# Patient Record
Sex: FEMALE | Race: WHITE | Employment: FULL TIME
[De-identification: names, ages, dates, MRNs, and addresses within clinical notes are randomized per-mention and may not be internally consistent; named-entity substitution may affect disease eponyms.]

---

## 2017-02-20 ENCOUNTER — HOSPITAL ENCOUNTER (OUTPATIENT)
Age: 52
Discharge: HOME | End: 2017-02-20
Payer: COMMERCIAL

## 2017-02-20 ENCOUNTER — HOSPITAL ENCOUNTER (OUTPATIENT)
Dept: GENERAL RADIOLOGY | Age: 52
Discharge: HOME | End: 2017-02-20
Payer: COMMERCIAL

## 2017-02-20 DIAGNOSIS — R05.8 COUGH WITH SPUTUM: ICD-10-CM

## 2017-02-20 PROCEDURE — 71020 XR CHEST STANDARD TWO VW: CPT | Performed by: RADIOLOGY

## 2017-02-20 PROCEDURE — 71020 XR CHEST STANDARD TWO VW: CPT

## 2017-03-08 ENCOUNTER — HOSPITAL ENCOUNTER (OUTPATIENT)
Age: 52
Discharge: HOME | End: 2017-03-08
Payer: COMMERCIAL

## 2017-03-08 ENCOUNTER — HOSPITAL ENCOUNTER (OUTPATIENT)
Dept: GENERAL RADIOLOGY | Age: 52
Discharge: HOME | End: 2017-03-08
Payer: COMMERCIAL

## 2017-03-08 DIAGNOSIS — J14 PNEUMONIA OF LEFT LOWER LOBE DUE TO HAEMOPHILUS INFLUENZAE (HCC): ICD-10-CM

## 2017-03-08 PROCEDURE — 71020 XR CHEST STANDARD TWO VW: CPT

## 2017-03-25 ENCOUNTER — APPOINTMENT (OUTPATIENT)
Dept: GENERAL RADIOLOGY | Age: 52
DRG: 291 | End: 2017-03-25
Payer: COMMERCIAL

## 2017-03-25 ENCOUNTER — HOSPITAL ENCOUNTER (INPATIENT)
Age: 52
LOS: 6 days | Discharge: HOME | DRG: 291 | End: 2017-03-31
Attending: EMERGENCY MEDICINE
Payer: COMMERCIAL

## 2017-03-25 DIAGNOSIS — I48.92 PAROXYSMAL ATRIAL FLUTTER (HCC): ICD-10-CM

## 2017-03-25 DIAGNOSIS — I50.9 CONGESTIVE HEART FAILURE, UNSPECIFIED CONGESTIVE HEART FAILURE CHRONICITY, UNSPECIFIED CONGESTIVE HEART FAILURE TYPE: Primary | ICD-10-CM

## 2017-03-25 DIAGNOSIS — J18.9 PNEUMONIA DUE TO ORGANISM: ICD-10-CM

## 2017-03-25 DIAGNOSIS — N17.9 AKI (ACUTE KIDNEY INJURY) (HCC): ICD-10-CM

## 2017-03-25 LAB
ABSOLUTE EOS #: 0 K/UL (ref 0–0.4)
ABSOLUTE LYMPH #: 2.4 K/UL (ref 1–4.8)
ABSOLUTE MONO #: 0.4 K/UL (ref 0.1–1.3)
ANION GAP SERPL CALCULATED.3IONS-SCNC: 15 MMOL/L (ref 9–17)
BASOPHILS # BLD: 1 % (ref 0–2)
BASOPHILS ABSOLUTE: 0.1 K/UL (ref 0–0.2)
BNP INTERPRETATION: ABNORMAL
BUN BLDV-MCNC: 26 MG/DL (ref 6–20)
BUN/CREAT BLD: ABNORMAL (ref 9–20)
CALCIUM SERPL-MCNC: 9.3 MG/DL (ref 8.6–10.4)
CHLORIDE BLD-SCNC: 103 MMOL/L (ref 98–107)
CO2: 20 MMOL/L (ref 20–31)
CREAT SERPL-MCNC: 0.97 MG/DL (ref 0.5–0.9)
DIFFERENTIAL TYPE: ABNORMAL
EOSINOPHILS RELATIVE PERCENT: 1 % (ref 0–4)
GFR AFRICAN AMERICAN: >60 ML/MIN
GFR NON-AFRICAN AMERICAN: >60 ML/MIN
GFR SERPL CREATININE-BSD FRML MDRD: ABNORMAL ML/MIN/{1.73_M2}
GFR SERPL CREATININE-BSD FRML MDRD: ABNORMAL ML/MIN/{1.73_M2}
GLUCOSE BLD-MCNC: 158 MG/DL (ref 70–99)
HCT VFR BLD CALC: 45.1 % (ref 36–46)
HEMOGLOBIN: 14.9 G/DL (ref 12–16)
LYMPHOCYTES # BLD: 26 % (ref 24–44)
MAGNESIUM: 2 MG/DL (ref 1.6–2.6)
MCH RBC QN AUTO: 29.2 PG (ref 26–34)
MCHC RBC AUTO-ENTMCNC: 33.1 G/DL (ref 31–37)
MCV RBC AUTO: 88.3 FL (ref 80–100)
MONOCYTES # BLD: 4 % (ref 1–7)
PDW BLD-RTO: 13.4 % (ref 11.5–14.9)
PLATELET # BLD: 197 K/UL (ref 150–450)
PLATELET ESTIMATE: ABNORMAL
PMV BLD AUTO: 10.4 FL (ref 6–12)
POTASSIUM SERPL-SCNC: 4.7 MMOL/L (ref 3.7–5.3)
PRO-BNP: 4978 PG/ML
RBC # BLD: 5.11 M/UL (ref 4–5.2)
RBC # BLD: ABNORMAL 10*6/UL
SEG NEUTROPHILS: 68 % (ref 36–66)
SEGMENTED NEUTROPHILS ABSOLUTE COUNT: 6.4 K/UL (ref 1.3–9.1)
SODIUM BLD-SCNC: 138 MMOL/L (ref 135–144)
TROPONIN INTERP: NORMAL
TROPONIN T: <0.03 NG/ML
WBC # BLD: 9.3 K/UL (ref 3.5–11)
WBC # BLD: ABNORMAL 10*3/UL

## 2017-03-25 PROCEDURE — 93005 ELECTROCARDIOGRAM TRACING: CPT

## 2017-03-25 PROCEDURE — 36415 COLL VENOUS BLD VENIPUNCTURE: CPT

## 2017-03-25 PROCEDURE — 2060000000 HC ICU INTERMEDIATE R&B

## 2017-03-25 PROCEDURE — 71020 XR CHEST STANDARD TWO VW: CPT

## 2017-03-25 PROCEDURE — 6360000002 HC RX W HCPCS: Performed by: EMERGENCY MEDICINE

## 2017-03-25 PROCEDURE — 2580000003 HC RX 258

## 2017-03-25 PROCEDURE — 6370000000 HC RX 637 (ALT 250 FOR IP): Performed by: EMERGENCY MEDICINE

## 2017-03-25 PROCEDURE — 85025 COMPLETE CBC W/AUTO DIFF WBC: CPT

## 2017-03-25 PROCEDURE — 99285 EMERGENCY DEPT VISIT HI MDM: CPT

## 2017-03-25 PROCEDURE — 83880 ASSAY OF NATRIURETIC PEPTIDE: CPT

## 2017-03-25 PROCEDURE — 80048 BASIC METABOLIC PNL TOTAL CA: CPT

## 2017-03-25 PROCEDURE — 83735 ASSAY OF MAGNESIUM: CPT

## 2017-03-25 PROCEDURE — 2500000003 HC RX 250 WO HCPCS: Performed by: EMERGENCY MEDICINE

## 2017-03-25 PROCEDURE — 2580000003 HC RX 258: Performed by: EMERGENCY MEDICINE

## 2017-03-25 PROCEDURE — 84484 ASSAY OF TROPONIN QUANT: CPT

## 2017-03-25 RX ORDER — DOXYCYCLINE 100 MG/1
100 TABLET ORAL 2 TIMES DAILY
Qty: 13 TABLET | Refills: 0 | Status: SHIPPED | OUTPATIENT
Start: 2017-03-25 | End: 2017-03-31

## 2017-03-25 RX ORDER — ALBUTEROL SULFATE 90 UG/1
1-2 AEROSOL, METERED RESPIRATORY (INHALATION) EVERY 4 HOURS PRN
Qty: 1 INHALER | Refills: 0 | Status: SHIPPED | OUTPATIENT
Start: 2017-03-25 | End: 2017-03-31

## 2017-03-25 RX ORDER — ACETAMINOPHEN 325 MG/1
650 TABLET ORAL EVERY 4 HOURS PRN
Status: DISCONTINUED | OUTPATIENT
Start: 2017-03-25 | End: 2017-03-31 | Stop reason: HOSPADM

## 2017-03-25 RX ORDER — ONDANSETRON 4 MG/1
4 TABLET, ORALLY DISINTEGRATING ORAL EVERY 8 HOURS PRN
Qty: 8 TABLET | Refills: 0 | Status: SHIPPED | OUTPATIENT
Start: 2017-03-25 | End: 2017-03-31

## 2017-03-25 RX ORDER — DOXYCYCLINE 100 MG/1
100 CAPSULE ORAL ONCE
Status: COMPLETED | OUTPATIENT
Start: 2017-03-25 | End: 2017-03-25

## 2017-03-25 RX ORDER — DILTIAZEM HYDROCHLORIDE 5 MG/ML
20 INJECTION INTRAVENOUS ONCE
Status: COMPLETED | OUTPATIENT
Start: 2017-03-25 | End: 2017-03-25

## 2017-03-25 RX ORDER — SODIUM CHLORIDE 0.9 % (FLUSH) 0.9 %
10 SYRINGE (ML) INJECTION PRN
Status: DISCONTINUED | OUTPATIENT
Start: 2017-03-25 | End: 2017-03-31 | Stop reason: HOSPADM

## 2017-03-25 RX ORDER — ONDANSETRON 4 MG/1
4 TABLET, FILM COATED ORAL ONCE
Status: COMPLETED | OUTPATIENT
Start: 2017-03-25 | End: 2017-03-25

## 2017-03-25 RX ORDER — BUDESONIDE AND FORMOTEROL FUMARATE DIHYDRATE 160; 4.5 UG/1; UG/1
2 AEROSOL RESPIRATORY (INHALATION) 2 TIMES DAILY
Status: ON HOLD | COMMUNITY
End: 2017-04-26

## 2017-03-25 RX ORDER — SODIUM CHLORIDE 0.9 % (FLUSH) 0.9 %
10 SYRINGE (ML) INJECTION EVERY 12 HOURS SCHEDULED
Status: DISCONTINUED | OUTPATIENT
Start: 2017-03-25 | End: 2017-03-31 | Stop reason: HOSPADM

## 2017-03-25 RX ADMIN — DILTIAZEM HYDROCHLORIDE 5 MG/HR: 5 INJECTION INTRAVENOUS at 21:55

## 2017-03-25 RX ADMIN — ENOXAPARIN SODIUM 90 MG: 100 INJECTION SUBCUTANEOUS at 21:17

## 2017-03-25 RX ADMIN — DOXYCYCLINE 100 MG: 100 CAPSULE ORAL at 20:36

## 2017-03-25 RX ADMIN — ONDANSETRON HYDROCHLORIDE 4 MG: 4 TABLET, FILM COATED ORAL at 19:51

## 2017-03-25 RX ADMIN — Medication 10 ML: at 22:47

## 2017-03-25 RX ADMIN — DILTIAZEM HYDROCHLORIDE 20 MG: 5 INJECTION INTRAVENOUS at 21:48

## 2017-03-25 ASSESSMENT — ENCOUNTER SYMPTOMS
ABDOMINAL PAIN: 0
NAUSEA: 1
CONSTIPATION: 0
COLOR CHANGE: 0
COUGH: 1
SHORTNESS OF BREATH: 1
VOMITING: 1
DIARRHEA: 0

## 2017-03-25 ASSESSMENT — PAIN SCALES - GENERAL: PAINLEVEL_OUTOF10: 0

## 2017-03-26 ENCOUNTER — APPOINTMENT (OUTPATIENT)
Dept: CT IMAGING | Age: 52
DRG: 291 | End: 2017-03-26
Payer: COMMERCIAL

## 2017-03-26 PROBLEM — I48.92 ATRIAL FLUTTER (HCC): Status: ACTIVE | Noted: 2017-03-26

## 2017-03-26 LAB
ABSOLUTE EOS #: 0.1 K/UL (ref 0–0.4)
ABSOLUTE LYMPH #: 2.3 K/UL (ref 1–4.8)
ABSOLUTE MONO #: 0.6 K/UL (ref 0.1–1.3)
ANION GAP SERPL CALCULATED.3IONS-SCNC: 14 MMOL/L (ref 9–17)
BASOPHILS # BLD: 1 % (ref 0–2)
BASOPHILS ABSOLUTE: 0.1 K/UL (ref 0–0.2)
BUN BLDV-MCNC: 18 MG/DL (ref 6–20)
BUN/CREAT BLD: ABNORMAL (ref 9–20)
CALCIUM SERPL-MCNC: 9 MG/DL (ref 8.6–10.4)
CHLORIDE BLD-SCNC: 102 MMOL/L (ref 98–107)
CO2: 22 MMOL/L (ref 20–31)
CREAT SERPL-MCNC: 0.81 MG/DL (ref 0.5–0.9)
CULTURE: ABNORMAL
CULTURE: ABNORMAL
DIFFERENTIAL TYPE: NORMAL
DIRECT EXAM: ABNORMAL
DIRECT EXAM: NORMAL
EOSINOPHILS RELATIVE PERCENT: 1 % (ref 0–4)
GFR AFRICAN AMERICAN: >60 ML/MIN
GFR NON-AFRICAN AMERICAN: >60 ML/MIN
GFR SERPL CREATININE-BSD FRML MDRD: ABNORMAL ML/MIN/{1.73_M2}
GFR SERPL CREATININE-BSD FRML MDRD: ABNORMAL ML/MIN/{1.73_M2}
GLUCOSE BLD-MCNC: 163 MG/DL (ref 70–99)
HCT VFR BLD CALC: 43 % (ref 36–46)
HEMOGLOBIN: 13.8 G/DL (ref 12–16)
LYMPHOCYTES # BLD: 29 % (ref 24–44)
Lab: ABNORMAL
Lab: NORMAL
Lab: NORMAL
MCH RBC QN AUTO: 28.7 PG (ref 26–34)
MCHC RBC AUTO-ENTMCNC: 32.2 G/DL (ref 31–37)
MCV RBC AUTO: 89.1 FL (ref 80–100)
MONOCYTES # BLD: 7 % (ref 1–7)
PDW BLD-RTO: 13.4 % (ref 11.5–14.9)
PLATELET # BLD: 185 K/UL (ref 150–450)
PLATELET ESTIMATE: NORMAL
PMV BLD AUTO: 10.6 FL (ref 6–12)
POTASSIUM SERPL-SCNC: 4.3 MMOL/L (ref 3.7–5.3)
RBC # BLD: 4.82 M/UL (ref 4–5.2)
RBC # BLD: NORMAL 10*6/UL
SEG NEUTROPHILS: 62 % (ref 36–66)
SEGMENTED NEUTROPHILS ABSOLUTE COUNT: 4.9 K/UL (ref 1.3–9.1)
SODIUM BLD-SCNC: 138 MMOL/L (ref 135–144)
SPECIMEN DESCRIPTION: ABNORMAL
SPECIMEN DESCRIPTION: ABNORMAL
SPECIMEN DESCRIPTION: NORMAL
STATUS: ABNORMAL
STATUS: NORMAL
STATUS: NORMAL
WBC # BLD: 7.9 K/UL (ref 3.5–11)
WBC # BLD: NORMAL 10*3/UL

## 2017-03-26 PROCEDURE — 80048 BASIC METABOLIC PNL TOTAL CA: CPT

## 2017-03-26 PROCEDURE — 87899 AGENT NOS ASSAY W/OPTIC: CPT

## 2017-03-26 PROCEDURE — 6360000002 HC RX W HCPCS: Performed by: INTERNAL MEDICINE

## 2017-03-26 PROCEDURE — 2060000000 HC ICU INTERMEDIATE R&B

## 2017-03-26 PROCEDURE — 87070 CULTURE OTHR SPECIMN AEROBIC: CPT

## 2017-03-26 PROCEDURE — 87449 NOS EACH ORGANISM AG IA: CPT

## 2017-03-26 PROCEDURE — 2580000003 HC RX 258: Performed by: EMERGENCY MEDICINE

## 2017-03-26 PROCEDURE — 2580000003 HC RX 258

## 2017-03-26 PROCEDURE — 36415 COLL VENOUS BLD VENIPUNCTURE: CPT

## 2017-03-26 PROCEDURE — 2580000003 HC RX 258: Performed by: INTERNAL MEDICINE

## 2017-03-26 PROCEDURE — 87040 BLOOD CULTURE FOR BACTERIA: CPT

## 2017-03-26 PROCEDURE — 2500000003 HC RX 250 WO HCPCS: Performed by: EMERGENCY MEDICINE

## 2017-03-26 PROCEDURE — 6360000002 HC RX W HCPCS

## 2017-03-26 PROCEDURE — 85025 COMPLETE CBC W/AUTO DIFF WBC: CPT

## 2017-03-26 PROCEDURE — 6370000000 HC RX 637 (ALT 250 FOR IP): Performed by: INTERNAL MEDICINE

## 2017-03-26 PROCEDURE — 6370000000 HC RX 637 (ALT 250 FOR IP)

## 2017-03-26 PROCEDURE — 71260 CT THORAX DX C+: CPT

## 2017-03-26 PROCEDURE — 87205 SMEAR GRAM STAIN: CPT

## 2017-03-26 PROCEDURE — 6360000004 HC RX CONTRAST MEDICATION

## 2017-03-26 PROCEDURE — 86738 MYCOPLASMA ANTIBODY: CPT

## 2017-03-26 RX ORDER — 0.9 % SODIUM CHLORIDE 0.9 %
100 INTRAVENOUS SOLUTION INTRAVENOUS ONCE
Status: COMPLETED | OUTPATIENT
Start: 2017-03-26 | End: 2017-03-26

## 2017-03-26 RX ORDER — CEFTRIAXONE 1 G/1
1 INJECTION, POWDER, FOR SOLUTION INTRAMUSCULAR; INTRAVENOUS EVERY 24 HOURS
Status: DISCONTINUED | OUTPATIENT
Start: 2017-03-26 | End: 2017-03-26

## 2017-03-26 RX ORDER — CEFTRIAXONE 1 G/1
2 INJECTION, POWDER, FOR SOLUTION INTRAMUSCULAR; INTRAVENOUS EVERY 24 HOURS
Status: DISCONTINUED | OUTPATIENT
Start: 2017-03-27 | End: 2017-03-26 | Stop reason: CLARIF

## 2017-03-26 RX ORDER — SODIUM CHLORIDE 0.9 % (FLUSH) 0.9 %
10 SYRINGE (ML) INJECTION PRN
Status: DISCONTINUED | OUTPATIENT
Start: 2017-03-26 | End: 2017-03-28

## 2017-03-26 RX ADMIN — ENOXAPARIN SODIUM 40 MG: 40 INJECTION SUBCUTANEOUS at 13:34

## 2017-03-26 RX ADMIN — CEFTRIAXONE SODIUM 2 G: 2 INJECTION, POWDER, FOR SOLUTION INTRAMUSCULAR; INTRAVENOUS at 13:35

## 2017-03-26 RX ADMIN — ACETAMINOPHEN 650 MG: 325 TABLET ORAL at 22:32

## 2017-03-26 RX ADMIN — AZITHROMYCIN MONOHYDRATE 500 MG: 500 INJECTION, POWDER, LYOPHILIZED, FOR SOLUTION INTRAVENOUS at 22:32

## 2017-03-26 RX ADMIN — ACETAMINOPHEN 650 MG: 325 TABLET ORAL at 14:57

## 2017-03-26 RX ADMIN — ACETAMINOPHEN 650 MG: 325 TABLET ORAL at 03:49

## 2017-03-26 RX ADMIN — Medication 10 ML: at 17:48

## 2017-03-26 RX ADMIN — DILTIAZEM HYDROCHLORIDE 10 MG/HR: 5 INJECTION INTRAVENOUS at 18:59

## 2017-03-26 RX ADMIN — IOVERSOL 100 ML: 741 INJECTION INTRA-ARTERIAL; INTRAVENOUS at 17:47

## 2017-03-26 RX ADMIN — SODIUM CHLORIDE 100 ML: 9 INJECTION, SOLUTION INTRAVENOUS at 17:47

## 2017-03-26 RX ADMIN — DILTIAZEM HYDROCHLORIDE 15 MG/HR: 5 INJECTION INTRAVENOUS at 08:12

## 2017-03-26 RX ADMIN — APIXABAN 5 MG: 5 TABLET, FILM COATED ORAL at 22:32

## 2017-03-26 ASSESSMENT — PAIN SCALES - GENERAL
PAINLEVEL_OUTOF10: 3
PAINLEVEL_OUTOF10: 0
PAINLEVEL_OUTOF10: 7
PAINLEVEL_OUTOF10: 4
PAINLEVEL_OUTOF10: 3
PAINLEVEL_OUTOF10: 2

## 2017-03-26 ASSESSMENT — PAIN DESCRIPTION - PROGRESSION
CLINICAL_PROGRESSION: NOT CHANGED
CLINICAL_PROGRESSION: NOT CHANGED

## 2017-03-26 ASSESSMENT — PAIN DESCRIPTION - DESCRIPTORS: DESCRIPTORS: POUNDING

## 2017-03-26 ASSESSMENT — PAIN DESCRIPTION - FREQUENCY: FREQUENCY: CONTINUOUS

## 2017-03-26 ASSESSMENT — PAIN DESCRIPTION - LOCATION: LOCATION: HEAD

## 2017-03-26 ASSESSMENT — PAIN DESCRIPTION - ONSET: ONSET: SUDDEN

## 2017-03-26 ASSESSMENT — PAIN DESCRIPTION - PAIN TYPE: TYPE: ACUTE PAIN

## 2017-03-27 LAB
ABSOLUTE EOS #: 0.1 K/UL (ref 0–0.4)
ABSOLUTE LYMPH #: 1.7 K/UL (ref 1–4.8)
ABSOLUTE MONO #: 0.4 K/UL (ref 0.1–1.3)
ANION GAP SERPL CALCULATED.3IONS-SCNC: 13 MMOL/L (ref 9–17)
BASOPHILS # BLD: 0 % (ref 0–2)
BASOPHILS ABSOLUTE: 0 K/UL (ref 0–0.2)
BNP INTERPRETATION: ABNORMAL
BUN BLDV-MCNC: 13 MG/DL (ref 6–20)
BUN/CREAT BLD: ABNORMAL (ref 9–20)
CALCIUM SERPL-MCNC: 8.8 MG/DL (ref 8.6–10.4)
CHLORIDE BLD-SCNC: 105 MMOL/L (ref 98–107)
CHOLESTEROL/HDL RATIO: 4.1
CHOLESTEROL: 155 MG/DL
CO2: 23 MMOL/L (ref 20–31)
CREAT SERPL-MCNC: 0.71 MG/DL (ref 0.5–0.9)
DIFFERENTIAL TYPE: NORMAL
EOSINOPHILS RELATIVE PERCENT: 1 % (ref 0–4)
GFR AFRICAN AMERICAN: >60 ML/MIN
GFR NON-AFRICAN AMERICAN: >60 ML/MIN
GFR SERPL CREATININE-BSD FRML MDRD: ABNORMAL ML/MIN/{1.73_M2}
GFR SERPL CREATININE-BSD FRML MDRD: ABNORMAL ML/MIN/{1.73_M2}
GLUCOSE BLD-MCNC: 106 MG/DL (ref 70–99)
HCT VFR BLD CALC: 41.1 % (ref 36–46)
HDLC SERPL-MCNC: 38 MG/DL
HEMOGLOBIN: 13.4 G/DL (ref 12–16)
LDL CHOLESTEROL: 102 MG/DL (ref 0–130)
LV EF: 20 %
LVEF MODALITY: NORMAL
LYMPHOCYTES # BLD: 26 % (ref 24–44)
MCH RBC QN AUTO: 28.8 PG (ref 26–34)
MCHC RBC AUTO-ENTMCNC: 32.5 G/DL (ref 31–37)
MCV RBC AUTO: 88.6 FL (ref 80–100)
MONOCYTES # BLD: 7 % (ref 1–7)
MYCOPLASMA PNEUMONIAE IGM: 0.44
PDW BLD-RTO: 13.2 % (ref 11.5–14.9)
PLATELET # BLD: 170 K/UL (ref 150–450)
PLATELET ESTIMATE: NORMAL
PMV BLD AUTO: 10 FL (ref 6–12)
POTASSIUM SERPL-SCNC: 4.4 MMOL/L (ref 3.7–5.3)
PRO-BNP: 1906 PG/ML
PROCALCITONIN: 0.05 NG/ML
RBC # BLD: 4.64 M/UL (ref 4–5.2)
RBC # BLD: NORMAL 10*6/UL
SEG NEUTROPHILS: 66 % (ref 36–66)
SEGMENTED NEUTROPHILS ABSOLUTE COUNT: 4.1 K/UL (ref 1.3–9.1)
SODIUM BLD-SCNC: 141 MMOL/L (ref 135–144)
TRIGL SERPL-MCNC: 75 MG/DL
TSH SERPL DL<=0.05 MIU/L-ACNC: 1.13 MIU/L (ref 0.3–5)
VLDLC SERPL CALC-MCNC: ABNORMAL MG/DL (ref 1–30)
WBC # BLD: 6.3 K/UL (ref 3.5–11)
WBC # BLD: NORMAL 10*3/UL

## 2017-03-27 PROCEDURE — 6370000000 HC RX 637 (ALT 250 FOR IP): Performed by: INTERNAL MEDICINE

## 2017-03-27 PROCEDURE — 2580000003 HC RX 258: Performed by: INTERNAL MEDICINE

## 2017-03-27 PROCEDURE — 6370000000 HC RX 637 (ALT 250 FOR IP)

## 2017-03-27 PROCEDURE — 84443 ASSAY THYROID STIM HORMONE: CPT

## 2017-03-27 PROCEDURE — 6360000002 HC RX W HCPCS: Performed by: INTERNAL MEDICINE

## 2017-03-27 PROCEDURE — 93306 TTE W/DOPPLER COMPLETE: CPT

## 2017-03-27 PROCEDURE — 2060000000 HC ICU INTERMEDIATE R&B

## 2017-03-27 PROCEDURE — 80048 BASIC METABOLIC PNL TOTAL CA: CPT

## 2017-03-27 PROCEDURE — 83880 ASSAY OF NATRIURETIC PEPTIDE: CPT

## 2017-03-27 PROCEDURE — 2580000003 HC RX 258: Performed by: EMERGENCY MEDICINE

## 2017-03-27 PROCEDURE — 80061 LIPID PANEL: CPT

## 2017-03-27 PROCEDURE — 2500000003 HC RX 250 WO HCPCS: Performed by: EMERGENCY MEDICINE

## 2017-03-27 PROCEDURE — 6360000002 HC RX W HCPCS

## 2017-03-27 PROCEDURE — 36415 COLL VENOUS BLD VENIPUNCTURE: CPT

## 2017-03-27 PROCEDURE — 2580000003 HC RX 258

## 2017-03-27 PROCEDURE — 6360000002 HC RX W HCPCS: Performed by: PHYSICIAN ASSISTANT

## 2017-03-27 PROCEDURE — 2580000003 HC RX 258: Performed by: PHYSICIAN ASSISTANT

## 2017-03-27 PROCEDURE — 85025 COMPLETE CBC W/AUTO DIFF WBC: CPT

## 2017-03-27 PROCEDURE — 84145 PROCALCITONIN (PCT): CPT

## 2017-03-27 RX ORDER — ONDANSETRON 2 MG/ML
4 INJECTION INTRAMUSCULAR; INTRAVENOUS EVERY 6 HOURS PRN
Status: DISCONTINUED | OUTPATIENT
Start: 2017-03-27 | End: 2017-03-31 | Stop reason: HOSPADM

## 2017-03-27 RX ORDER — FUROSEMIDE 10 MG/ML
20 INJECTION INTRAMUSCULAR; INTRAVENOUS DAILY
Status: DISCONTINUED | OUTPATIENT
Start: 2017-03-27 | End: 2017-03-29

## 2017-03-27 RX ORDER — 0.9 % SODIUM CHLORIDE 0.9 %
250 INTRAVENOUS SOLUTION INTRAVENOUS ONCE
Status: COMPLETED | OUTPATIENT
Start: 2017-03-27 | End: 2017-03-27

## 2017-03-27 RX ORDER — METOPROLOL SUCCINATE 25 MG/1
25 TABLET, EXTENDED RELEASE ORAL 2 TIMES DAILY
Status: DISCONTINUED | OUTPATIENT
Start: 2017-03-27 | End: 2017-03-28

## 2017-03-27 RX ORDER — DIGOXIN 0.25 MG/ML
500 INJECTION INTRAMUSCULAR; INTRAVENOUS ONCE
Status: COMPLETED | OUTPATIENT
Start: 2017-03-27 | End: 2017-03-27

## 2017-03-27 RX ORDER — METOPROLOL SUCCINATE 25 MG/1
25 TABLET, EXTENDED RELEASE ORAL ONCE
Status: COMPLETED | OUTPATIENT
Start: 2017-03-27 | End: 2017-03-27

## 2017-03-27 RX ADMIN — DILTIAZEM HYDROCHLORIDE 5 MG/HR: 5 INJECTION INTRAVENOUS at 11:49

## 2017-03-27 RX ADMIN — ACETAMINOPHEN 650 MG: 325 TABLET ORAL at 13:22

## 2017-03-27 RX ADMIN — APIXABAN 5 MG: 5 TABLET, FILM COATED ORAL at 09:43

## 2017-03-27 RX ADMIN — AZITHROMYCIN MONOHYDRATE 500 MG: 500 INJECTION, POWDER, LYOPHILIZED, FOR SOLUTION INTRAVENOUS at 13:17

## 2017-03-27 RX ADMIN — FUROSEMIDE 20 MG: 10 INJECTION, SOLUTION INTRAVENOUS at 13:16

## 2017-03-27 RX ADMIN — CEFTRIAXONE SODIUM 2 G: 2 INJECTION, POWDER, FOR SOLUTION INTRAMUSCULAR; INTRAVENOUS at 10:34

## 2017-03-27 RX ADMIN — METOPROLOL SUCCINATE 25 MG: 25 TABLET, EXTENDED RELEASE ORAL at 20:03

## 2017-03-27 RX ADMIN — ONDANSETRON 4 MG: 2 INJECTION INTRAMUSCULAR; INTRAVENOUS at 17:15

## 2017-03-27 RX ADMIN — Medication 10 ML: at 20:04

## 2017-03-27 RX ADMIN — METOPROLOL SUCCINATE 25 MG: 25 TABLET, EXTENDED RELEASE ORAL at 13:14

## 2017-03-27 RX ADMIN — METOPROLOL SUCCINATE 25 MG: 25 TABLET, EXTENDED RELEASE ORAL at 17:42

## 2017-03-27 RX ADMIN — DIGOXIN 500 MCG: 0.25 INJECTION INTRAMUSCULAR; INTRAVENOUS at 00:17

## 2017-03-27 RX ADMIN — DIGOXIN 500 MCG: 0.25 INJECTION INTRAMUSCULAR; INTRAVENOUS at 17:42

## 2017-03-27 RX ADMIN — SODIUM CHLORIDE 250 ML: 9 INJECTION, SOLUTION INTRAVENOUS at 00:18

## 2017-03-27 ASSESSMENT — PAIN SCALES - GENERAL
PAINLEVEL_OUTOF10: 0
PAINLEVEL_OUTOF10: 3

## 2017-03-28 ENCOUNTER — APPOINTMENT (OUTPATIENT)
Dept: GENERAL RADIOLOGY | Age: 52
DRG: 291 | End: 2017-03-28
Payer: COMMERCIAL

## 2017-03-28 LAB
ABSOLUTE EOS #: 0.2 K/UL (ref 0–0.4)
ABSOLUTE LYMPH #: 2.1 K/UL (ref 1–4.8)
ABSOLUTE MONO #: 0.6 K/UL (ref 0.1–1.3)
ANION GAP SERPL CALCULATED.3IONS-SCNC: 13 MMOL/L (ref 9–17)
BASOPHILS # BLD: 1 % (ref 0–2)
BASOPHILS ABSOLUTE: 0 K/UL (ref 0–0.2)
BUN BLDV-MCNC: 8 MG/DL (ref 6–20)
BUN/CREAT BLD: ABNORMAL (ref 9–20)
CALCIUM SERPL-MCNC: 8.8 MG/DL (ref 8.6–10.4)
CHLORIDE BLD-SCNC: 103 MMOL/L (ref 98–107)
CO2: 26 MMOL/L (ref 20–31)
CREAT SERPL-MCNC: 0.71 MG/DL (ref 0.5–0.9)
DIFFERENTIAL TYPE: ABNORMAL
EOSINOPHILS RELATIVE PERCENT: 3 % (ref 0–4)
GFR AFRICAN AMERICAN: >60 ML/MIN
GFR NON-AFRICAN AMERICAN: >60 ML/MIN
GFR SERPL CREATININE-BSD FRML MDRD: ABNORMAL ML/MIN/{1.73_M2}
GFR SERPL CREATININE-BSD FRML MDRD: ABNORMAL ML/MIN/{1.73_M2}
GLUCOSE BLD-MCNC: 105 MG/DL (ref 70–99)
HCT VFR BLD CALC: 45 % (ref 36–46)
HEMOGLOBIN: 14.5 G/DL (ref 12–16)
LYMPHOCYTES # BLD: 31 % (ref 24–44)
MCH RBC QN AUTO: 28.2 PG (ref 26–34)
MCHC RBC AUTO-ENTMCNC: 32.3 G/DL (ref 31–37)
MCV RBC AUTO: 87.5 FL (ref 80–100)
MONOCYTES # BLD: 9 % (ref 1–7)
PDW BLD-RTO: 13 % (ref 11.5–14.9)
PLATELET # BLD: 190 K/UL (ref 150–450)
PLATELET ESTIMATE: ABNORMAL
PMV BLD AUTO: 9.6 FL (ref 6–12)
POTASSIUM SERPL-SCNC: 4.1 MMOL/L (ref 3.7–5.3)
RBC # BLD: 5.14 M/UL (ref 4–5.2)
RBC # BLD: ABNORMAL 10*6/UL
SEG NEUTROPHILS: 56 % (ref 36–66)
SEGMENTED NEUTROPHILS ABSOLUTE COUNT: 3.9 K/UL (ref 1.3–9.1)
SODIUM BLD-SCNC: 142 MMOL/L (ref 135–144)
WBC # BLD: 6.8 K/UL (ref 3.5–11)
WBC # BLD: ABNORMAL 10*3/UL

## 2017-03-28 PROCEDURE — 36415 COLL VENOUS BLD VENIPUNCTURE: CPT

## 2017-03-28 PROCEDURE — 6370000000 HC RX 637 (ALT 250 FOR IP): Performed by: INTERNAL MEDICINE

## 2017-03-28 PROCEDURE — 85025 COMPLETE CBC W/AUTO DIFF WBC: CPT

## 2017-03-28 PROCEDURE — 6360000002 HC RX W HCPCS

## 2017-03-28 PROCEDURE — 71020 XR CHEST STANDARD TWO VW: CPT

## 2017-03-28 PROCEDURE — 80048 BASIC METABOLIC PNL TOTAL CA: CPT

## 2017-03-28 PROCEDURE — 2060000000 HC ICU INTERMEDIATE R&B

## 2017-03-28 PROCEDURE — 71035 XR CHEST DECUBITUS RIGHT: CPT

## 2017-03-28 PROCEDURE — 6360000002 HC RX W HCPCS: Performed by: INTERNAL MEDICINE

## 2017-03-28 PROCEDURE — 71035 XR CHEST DECUBITUS LEFT: CPT

## 2017-03-28 PROCEDURE — 2580000003 HC RX 258

## 2017-03-28 PROCEDURE — 2580000003 HC RX 258: Performed by: INTERNAL MEDICINE

## 2017-03-28 RX ORDER — METOPROLOL SUCCINATE 50 MG/1
50 TABLET, EXTENDED RELEASE ORAL 2 TIMES DAILY
Status: DISCONTINUED | OUTPATIENT
Start: 2017-03-28 | End: 2017-03-30

## 2017-03-28 RX ORDER — DIGOXIN 250 MCG
250 TABLET ORAL DAILY
Status: DISCONTINUED | OUTPATIENT
Start: 2017-03-28 | End: 2017-03-30

## 2017-03-28 RX ORDER — FUROSEMIDE 10 MG/ML
20 INJECTION INTRAMUSCULAR; INTRAVENOUS ONCE
Status: COMPLETED | OUTPATIENT
Start: 2017-03-28 | End: 2017-03-28

## 2017-03-28 RX ORDER — METOPROLOL SUCCINATE 25 MG/1
25 TABLET, EXTENDED RELEASE ORAL ONCE
Status: COMPLETED | OUTPATIENT
Start: 2017-03-28 | End: 2017-03-28

## 2017-03-28 RX ADMIN — Medication 10 ML: at 07:36

## 2017-03-28 RX ADMIN — METOPROLOL SUCCINATE 25 MG: 25 TABLET, EXTENDED RELEASE ORAL at 14:37

## 2017-03-28 RX ADMIN — METOPROLOL SUCCINATE 50 MG: 50 TABLET, EXTENDED RELEASE ORAL at 20:57

## 2017-03-28 RX ADMIN — DIGOXIN 250 MCG: 250 TABLET ORAL at 14:37

## 2017-03-28 RX ADMIN — AZITHROMYCIN MONOHYDRATE 500 MG: 500 INJECTION, POWDER, LYOPHILIZED, FOR SOLUTION INTRAVENOUS at 13:57

## 2017-03-28 RX ADMIN — Medication 10 ML: at 20:57

## 2017-03-28 RX ADMIN — METOPROLOL SUCCINATE 25 MG: 25 TABLET, EXTENDED RELEASE ORAL at 07:33

## 2017-03-28 RX ADMIN — FUROSEMIDE 20 MG: 10 INJECTION, SOLUTION INTRAVENOUS at 07:33

## 2017-03-28 RX ADMIN — FUROSEMIDE 20 MG: 10 INJECTION, SOLUTION INTRAVENOUS at 14:36

## 2017-03-28 RX ADMIN — CEFTRIAXONE SODIUM 2 G: 2 INJECTION, POWDER, FOR SOLUTION INTRAMUSCULAR; INTRAVENOUS at 10:45

## 2017-03-28 ASSESSMENT — PAIN SCALES - GENERAL
PAINLEVEL_OUTOF10: 0
PAINLEVEL_OUTOF10: 0

## 2017-03-29 ENCOUNTER — ANESTHESIA EVENT (OUTPATIENT)
Dept: OPERATING ROOM | Age: 52
DRG: 291 | End: 2017-03-29
Payer: COMMERCIAL

## 2017-03-29 ENCOUNTER — APPOINTMENT (OUTPATIENT)
Dept: INTERVENTIONAL RADIOLOGY/VASCULAR | Age: 52
DRG: 291 | End: 2017-03-29
Payer: COMMERCIAL

## 2017-03-29 LAB
ABSOLUTE EOS #: 0.2 K/UL (ref 0–0.4)
ABSOLUTE LYMPH #: 2.5 K/UL (ref 1–4.8)
ABSOLUTE MONO #: 0.8 K/UL (ref 0.1–1.3)
ANION GAP SERPL CALCULATED.3IONS-SCNC: 11 MMOL/L (ref 9–17)
BASOPHILS # BLD: 1 % (ref 0–2)
BASOPHILS ABSOLUTE: 0 K/UL (ref 0–0.2)
BUN BLDV-MCNC: 12 MG/DL (ref 6–20)
BUN/CREAT BLD: ABNORMAL (ref 9–20)
CALCIUM SERPL-MCNC: 9.8 MG/DL (ref 8.6–10.4)
CHLORIDE BLD-SCNC: 99 MMOL/L (ref 98–107)
CO2: 32 MMOL/L (ref 20–31)
CREAT SERPL-MCNC: 0.83 MG/DL (ref 0.5–0.9)
DIFFERENTIAL TYPE: ABNORMAL
EOSINOPHILS RELATIVE PERCENT: 3 % (ref 0–4)
GFR AFRICAN AMERICAN: >60 ML/MIN
GFR NON-AFRICAN AMERICAN: >60 ML/MIN
GFR SERPL CREATININE-BSD FRML MDRD: ABNORMAL ML/MIN/{1.73_M2}
GFR SERPL CREATININE-BSD FRML MDRD: ABNORMAL ML/MIN/{1.73_M2}
GLUCOSE BLD-MCNC: 107 MG/DL (ref 70–99)
HCT VFR BLD CALC: 49.8 % (ref 36–46)
HEMOGLOBIN: 16.1 G/DL (ref 12–16)
LACTATE DEHYDROGENASE: 182 U/L (ref 135–214)
LYMPHOCYTES # BLD: 34 % (ref 24–44)
MCH RBC QN AUTO: 28.5 PG (ref 26–34)
MCHC RBC AUTO-ENTMCNC: 32.5 G/DL (ref 31–37)
MCV RBC AUTO: 87.9 FL (ref 80–100)
MONOCYTES # BLD: 10 % (ref 1–7)
PDW BLD-RTO: 13.4 % (ref 11.5–14.9)
PLATELET # BLD: 217 K/UL (ref 150–450)
PLATELET ESTIMATE: ABNORMAL
PMV BLD AUTO: 9.3 FL (ref 6–12)
POTASSIUM SERPL-SCNC: 4.8 MMOL/L (ref 3.7–5.3)
RBC # BLD: 5.66 M/UL (ref 4–5.2)
RBC # BLD: ABNORMAL 10*6/UL
SEG NEUTROPHILS: 52 % (ref 36–66)
SEGMENTED NEUTROPHILS ABSOLUTE COUNT: 3.7 K/UL (ref 1.3–9.1)
SODIUM BLD-SCNC: 142 MMOL/L (ref 135–144)
TOTAL PROTEIN: 6.9 G/DL (ref 6.4–8.3)
WBC # BLD: 7.2 K/UL (ref 3.5–11)
WBC # BLD: ABNORMAL 10*3/UL

## 2017-03-29 PROCEDURE — 2060000000 HC ICU INTERMEDIATE R&B

## 2017-03-29 PROCEDURE — 2580000003 HC RX 258: Performed by: INTERNAL MEDICINE

## 2017-03-29 PROCEDURE — 6360000002 HC RX W HCPCS: Performed by: INTERNAL MEDICINE

## 2017-03-29 PROCEDURE — 6370000000 HC RX 637 (ALT 250 FOR IP): Performed by: INTERNAL MEDICINE

## 2017-03-29 PROCEDURE — 36415 COLL VENOUS BLD VENIPUNCTURE: CPT

## 2017-03-29 PROCEDURE — 83615 LACTATE (LD) (LDH) ENZYME: CPT

## 2017-03-29 PROCEDURE — 6360000002 HC RX W HCPCS

## 2017-03-29 PROCEDURE — 2580000003 HC RX 258

## 2017-03-29 PROCEDURE — 85025 COMPLETE CBC W/AUTO DIFF WBC: CPT

## 2017-03-29 PROCEDURE — 80048 BASIC METABOLIC PNL TOTAL CA: CPT

## 2017-03-29 PROCEDURE — 84155 ASSAY OF PROTEIN SERUM: CPT

## 2017-03-29 RX ORDER — FUROSEMIDE 10 MG/ML
20 INJECTION INTRAMUSCULAR; INTRAVENOUS 2 TIMES DAILY
Status: DISCONTINUED | OUTPATIENT
Start: 2017-03-29 | End: 2017-03-31 | Stop reason: HOSPADM

## 2017-03-29 RX ADMIN — AZITHROMYCIN MONOHYDRATE 500 MG: 500 INJECTION, POWDER, LYOPHILIZED, FOR SOLUTION INTRAVENOUS at 14:09

## 2017-03-29 RX ADMIN — METOPROLOL SUCCINATE 50 MG: 50 TABLET, EXTENDED RELEASE ORAL at 08:58

## 2017-03-29 RX ADMIN — Medication 10 ML: at 20:52

## 2017-03-29 RX ADMIN — APIXABAN 5 MG: 5 TABLET, FILM COATED ORAL at 20:51

## 2017-03-29 RX ADMIN — FUROSEMIDE 20 MG: 10 INJECTION, SOLUTION INTRAVENOUS at 08:58

## 2017-03-29 RX ADMIN — METOPROLOL SUCCINATE 50 MG: 50 TABLET, EXTENDED RELEASE ORAL at 20:50

## 2017-03-29 RX ADMIN — CEFTRIAXONE SODIUM 1 G: 1 INJECTION, POWDER, FOR SOLUTION INTRAMUSCULAR; INTRAVENOUS at 11:16

## 2017-03-29 RX ADMIN — DIGOXIN 250 MCG: 250 TABLET ORAL at 08:58

## 2017-03-29 RX ADMIN — Medication 10 ML: at 08:57

## 2017-03-29 RX ADMIN — APIXABAN 5 MG: 5 TABLET, FILM COATED ORAL at 12:17

## 2017-03-29 RX ADMIN — FUROSEMIDE 20 MG: 10 INJECTION, SOLUTION INTRAVENOUS at 17:46

## 2017-03-29 ASSESSMENT — ENCOUNTER SYMPTOMS
SHORTNESS OF BREATH: 0
STRIDOR: 0

## 2017-03-29 ASSESSMENT — PAIN SCALES - GENERAL
PAINLEVEL_OUTOF10: 0

## 2017-03-30 ENCOUNTER — ANESTHESIA (OUTPATIENT)
Dept: OPERATING ROOM | Age: 52
DRG: 291 | End: 2017-03-30
Payer: COMMERCIAL

## 2017-03-30 VITALS — SYSTOLIC BLOOD PRESSURE: 86 MMHG | OXYGEN SATURATION: 95 % | DIASTOLIC BLOOD PRESSURE: 56 MMHG

## 2017-03-30 LAB
ABSOLUTE EOS #: 0.3 K/UL (ref 0–0.4)
ABSOLUTE LYMPH #: 3 K/UL (ref 1–4.8)
ABSOLUTE MONO #: 0.8 K/UL (ref 0.1–1.3)
ANION GAP SERPL CALCULATED.3IONS-SCNC: 13 MMOL/L (ref 9–17)
BASOPHILS # BLD: 1 % (ref 0–2)
BASOPHILS ABSOLUTE: 0.1 K/UL (ref 0–0.2)
BUN BLDV-MCNC: 14 MG/DL (ref 6–20)
BUN/CREAT BLD: ABNORMAL (ref 9–20)
CALCIUM SERPL-MCNC: 9.3 MG/DL (ref 8.6–10.4)
CHLORIDE BLD-SCNC: 98 MMOL/L (ref 98–107)
CO2: 27 MMOL/L (ref 20–31)
CREAT SERPL-MCNC: 0.78 MG/DL (ref 0.5–0.9)
DIFFERENTIAL TYPE: ABNORMAL
EOSINOPHILS RELATIVE PERCENT: 4 % (ref 0–4)
GFR AFRICAN AMERICAN: >60 ML/MIN
GFR NON-AFRICAN AMERICAN: >60 ML/MIN
GFR SERPL CREATININE-BSD FRML MDRD: ABNORMAL ML/MIN/{1.73_M2}
GFR SERPL CREATININE-BSD FRML MDRD: ABNORMAL ML/MIN/{1.73_M2}
GLUCOSE BLD-MCNC: 104 MG/DL (ref 70–99)
HCT VFR BLD CALC: 50 % (ref 36–46)
HEMOGLOBIN: 16.2 G/DL (ref 12–16)
LYMPHOCYTES # BLD: 36 % (ref 24–44)
MCH RBC QN AUTO: 28.5 PG (ref 26–34)
MCHC RBC AUTO-ENTMCNC: 32.4 G/DL (ref 31–37)
MCV RBC AUTO: 87.9 FL (ref 80–100)
MONOCYTES # BLD: 10 % (ref 1–7)
PDW BLD-RTO: 13.3 % (ref 11.5–14.9)
PLATELET # BLD: 226 K/UL (ref 150–450)
PLATELET ESTIMATE: ABNORMAL
PMV BLD AUTO: 9.4 FL (ref 6–12)
POTASSIUM SERPL-SCNC: 4.2 MMOL/L (ref 3.7–5.3)
RBC # BLD: 5.69 M/UL (ref 4–5.2)
RBC # BLD: ABNORMAL 10*6/UL
SEG NEUTROPHILS: 49 % (ref 36–66)
SEGMENTED NEUTROPHILS ABSOLUTE COUNT: 4.1 K/UL (ref 1.3–9.1)
SODIUM BLD-SCNC: 138 MMOL/L (ref 135–144)
WBC # BLD: 8.2 K/UL (ref 3.5–11)
WBC # BLD: ABNORMAL 10*3/UL

## 2017-03-30 PROCEDURE — 3700000000 HC ANESTHESIA ATTENDED CARE: Performed by: INTERNAL MEDICINE

## 2017-03-30 PROCEDURE — 6370000000 HC RX 637 (ALT 250 FOR IP): Performed by: INTERNAL MEDICINE

## 2017-03-30 PROCEDURE — 6360000002 HC RX W HCPCS

## 2017-03-30 PROCEDURE — 7100000001 HC PACU RECOVERY - ADDTL 15 MIN: Performed by: INTERNAL MEDICINE

## 2017-03-30 PROCEDURE — 5A2204Z RESTORATION OF CARDIAC RHYTHM, SINGLE: ICD-10-PCS | Performed by: INTERNAL MEDICINE

## 2017-03-30 PROCEDURE — 93312 ECHO TRANSESOPHAGEAL: CPT

## 2017-03-30 PROCEDURE — 2580000003 HC RX 258: Performed by: INTERNAL MEDICINE

## 2017-03-30 PROCEDURE — 7100000000 HC PACU RECOVERY - FIRST 15 MIN: Performed by: INTERNAL MEDICINE

## 2017-03-30 PROCEDURE — 85025 COMPLETE CBC W/AUTO DIFF WBC: CPT

## 2017-03-30 PROCEDURE — 2580000003 HC RX 258: Performed by: ANESTHESIOLOGY

## 2017-03-30 PROCEDURE — 93312 ECHO TRANSESOPHAGEAL: CPT | Performed by: INTERNAL MEDICINE

## 2017-03-30 PROCEDURE — 2500000003 HC RX 250 WO HCPCS: Performed by: ANESTHESIOLOGY

## 2017-03-30 PROCEDURE — 3700000001 HC ADD 15 MINUTES (ANESTHESIA): Performed by: INTERNAL MEDICINE

## 2017-03-30 PROCEDURE — 6360000002 HC RX W HCPCS: Performed by: INTERNAL MEDICINE

## 2017-03-30 PROCEDURE — 2580000003 HC RX 258

## 2017-03-30 PROCEDURE — 2060000000 HC ICU INTERMEDIATE R&B

## 2017-03-30 PROCEDURE — 3600000002 HC SURGERY LEVEL 2 BASE: Performed by: INTERNAL MEDICINE

## 2017-03-30 PROCEDURE — 36415 COLL VENOUS BLD VENIPUNCTURE: CPT

## 2017-03-30 PROCEDURE — 6360000002 HC RX W HCPCS: Performed by: ANESTHESIOLOGY

## 2017-03-30 PROCEDURE — 80048 BASIC METABOLIC PNL TOTAL CA: CPT

## 2017-03-30 PROCEDURE — 3600000012 HC SURGERY LEVEL 2 ADDTL 15MIN: Performed by: INTERNAL MEDICINE

## 2017-03-30 RX ORDER — SODIUM CHLORIDE 9 MG/ML
INJECTION, SOLUTION INTRAVENOUS CONTINUOUS
Status: DISCONTINUED | OUTPATIENT
Start: 2017-03-30 | End: 2017-03-30

## 2017-03-30 RX ORDER — LABETALOL HYDROCHLORIDE 5 MG/ML
5 INJECTION, SOLUTION INTRAVENOUS EVERY 10 MIN PRN
Status: DISCONTINUED | OUTPATIENT
Start: 2017-03-30 | End: 2017-03-30 | Stop reason: HOSPADM

## 2017-03-30 RX ORDER — LIDOCAINE HYDROCHLORIDE 10 MG/ML
INJECTION, SOLUTION EPIDURAL; INFILTRATION; INTRACAUDAL; PERINEURAL PRN
Status: DISCONTINUED | OUTPATIENT
Start: 2017-03-30 | End: 2017-03-30 | Stop reason: SDUPTHER

## 2017-03-30 RX ORDER — MEPERIDINE HYDROCHLORIDE 25 MG/ML
12.5 INJECTION INTRAMUSCULAR; INTRAVENOUS; SUBCUTANEOUS EVERY 5 MIN PRN
Status: DISCONTINUED | OUTPATIENT
Start: 2017-03-30 | End: 2017-03-30 | Stop reason: HOSPADM

## 2017-03-30 RX ORDER — PROPOFOL 10 MG/ML
INJECTION, EMULSION INTRAVENOUS PRN
Status: DISCONTINUED | OUTPATIENT
Start: 2017-03-30 | End: 2017-03-30 | Stop reason: SDUPTHER

## 2017-03-30 RX ORDER — AMIODARONE HYDROCHLORIDE 200 MG/1
400 TABLET ORAL 2 TIMES DAILY
Status: DISCONTINUED | OUTPATIENT
Start: 2017-03-30 | End: 2017-03-31 | Stop reason: HOSPADM

## 2017-03-30 RX ORDER — MIDAZOLAM HYDROCHLORIDE 1 MG/ML
INJECTION INTRAMUSCULAR; INTRAVENOUS PRN
Status: DISCONTINUED | OUTPATIENT
Start: 2017-03-30 | End: 2017-03-30 | Stop reason: SDUPTHER

## 2017-03-30 RX ORDER — PROMETHAZINE HYDROCHLORIDE 25 MG/ML
6.25 INJECTION, SOLUTION INTRAMUSCULAR; INTRAVENOUS
Status: DISCONTINUED | OUTPATIENT
Start: 2017-03-30 | End: 2017-03-30 | Stop reason: HOSPADM

## 2017-03-30 RX ORDER — FENTANYL CITRATE 50 UG/ML
25 INJECTION, SOLUTION INTRAMUSCULAR; INTRAVENOUS EVERY 5 MIN PRN
Status: DISCONTINUED | OUTPATIENT
Start: 2017-03-30 | End: 2017-03-30 | Stop reason: HOSPADM

## 2017-03-30 RX ORDER — DIPHENHYDRAMINE HYDROCHLORIDE 50 MG/ML
12.5 INJECTION INTRAMUSCULAR; INTRAVENOUS
Status: DISCONTINUED | OUTPATIENT
Start: 2017-03-30 | End: 2017-03-30 | Stop reason: HOSPADM

## 2017-03-30 RX ORDER — FENTANYL CITRATE 50 UG/ML
INJECTION, SOLUTION INTRAMUSCULAR; INTRAVENOUS PRN
Status: DISCONTINUED | OUTPATIENT
Start: 2017-03-30 | End: 2017-03-30 | Stop reason: SDUPTHER

## 2017-03-30 RX ORDER — METOPROLOL SUCCINATE 50 MG/1
50 TABLET, EXTENDED RELEASE ORAL DAILY
Status: DISCONTINUED | OUTPATIENT
Start: 2017-03-31 | End: 2017-03-31 | Stop reason: HOSPADM

## 2017-03-30 RX ORDER — ONDANSETRON 2 MG/ML
4 INJECTION INTRAMUSCULAR; INTRAVENOUS
Status: DISCONTINUED | OUTPATIENT
Start: 2017-03-30 | End: 2017-03-30 | Stop reason: HOSPADM

## 2017-03-30 RX ADMIN — FUROSEMIDE 20 MG: 10 INJECTION, SOLUTION INTRAVENOUS at 19:21

## 2017-03-30 RX ADMIN — APIXABAN 5 MG: 5 TABLET, FILM COATED ORAL at 09:05

## 2017-03-30 RX ADMIN — SODIUM CHLORIDE: 9 INJECTION, SOLUTION INTRAVENOUS at 13:30

## 2017-03-30 RX ADMIN — CEFTRIAXONE SODIUM 1 G: 1 INJECTION, POWDER, FOR SOLUTION INTRAMUSCULAR; INTRAVENOUS at 17:52

## 2017-03-30 RX ADMIN — APIXABAN 5 MG: 5 TABLET, FILM COATED ORAL at 22:03

## 2017-03-30 RX ADMIN — SODIUM CHLORIDE: 9 INJECTION, SOLUTION INTRAVENOUS at 12:02

## 2017-03-30 RX ADMIN — LIDOCAINE HYDROCHLORIDE 50 MG: 10 INJECTION, SOLUTION EPIDURAL; INFILTRATION; INTRACAUDAL; PERINEURAL at 12:47

## 2017-03-30 RX ADMIN — FENTANYL CITRATE 100 MCG: 50 INJECTION INTRAMUSCULAR; INTRAVENOUS at 12:47

## 2017-03-30 RX ADMIN — Medication 10 ML: at 09:05

## 2017-03-30 RX ADMIN — Medication 10 ML: at 22:03

## 2017-03-30 RX ADMIN — MIDAZOLAM 2 MG: 1 INJECTION INTRAMUSCULAR; INTRAVENOUS at 12:47

## 2017-03-30 RX ADMIN — PROPOFOL 70 MG: 10 INJECTION, EMULSION INTRAVENOUS at 12:47

## 2017-03-30 RX ADMIN — DIGOXIN 250 MCG: 250 TABLET ORAL at 09:04

## 2017-03-30 RX ADMIN — AZITHROMYCIN MONOHYDRATE 500 MG: 500 INJECTION, POWDER, LYOPHILIZED, FOR SOLUTION INTRAVENOUS at 22:07

## 2017-03-30 RX ADMIN — AMIODARONE HYDROCHLORIDE 400 MG: 200 TABLET ORAL at 17:21

## 2017-03-30 RX ADMIN — METOPROLOL SUCCINATE 50 MG: 50 TABLET, EXTENDED RELEASE ORAL at 09:04

## 2017-03-30 RX ADMIN — FUROSEMIDE 20 MG: 10 INJECTION, SOLUTION INTRAVENOUS at 09:04

## 2017-03-30 ASSESSMENT — PAIN - FUNCTIONAL ASSESSMENT: PAIN_FUNCTIONAL_ASSESSMENT: 0-10

## 2017-03-31 VITALS
OXYGEN SATURATION: 97 % | HEART RATE: 61 BPM | RESPIRATION RATE: 16 BRPM | HEIGHT: 68 IN | SYSTOLIC BLOOD PRESSURE: 115 MMHG | DIASTOLIC BLOOD PRESSURE: 62 MMHG | BODY MASS INDEX: 29.4 KG/M2 | TEMPERATURE: 98.1 F | WEIGHT: 194 LBS

## 2017-03-31 LAB
ABSOLUTE EOS #: 0.3 K/UL (ref 0–0.4)
ABSOLUTE LYMPH #: 2.6 K/UL (ref 1–4.8)
ABSOLUTE MONO #: 0.6 K/UL (ref 0.1–1.3)
ANION GAP SERPL CALCULATED.3IONS-SCNC: 14 MMOL/L (ref 9–17)
BASOPHILS # BLD: 1 % (ref 0–2)
BASOPHILS ABSOLUTE: 0 K/UL (ref 0–0.2)
BNP INTERPRETATION: ABNORMAL
BUN BLDV-MCNC: 13 MG/DL (ref 6–20)
BUN/CREAT BLD: NORMAL (ref 9–20)
CALCIUM SERPL-MCNC: 9 MG/DL (ref 8.6–10.4)
CHLORIDE BLD-SCNC: 98 MMOL/L (ref 98–107)
CO2: 23 MMOL/L (ref 20–31)
CREAT SERPL-MCNC: 0.64 MG/DL (ref 0.5–0.9)
DIFFERENTIAL TYPE: ABNORMAL
EOSINOPHILS RELATIVE PERCENT: 3 % (ref 0–4)
GFR AFRICAN AMERICAN: >60 ML/MIN
GFR NON-AFRICAN AMERICAN: >60 ML/MIN
GFR SERPL CREATININE-BSD FRML MDRD: NORMAL ML/MIN/{1.73_M2}
GFR SERPL CREATININE-BSD FRML MDRD: NORMAL ML/MIN/{1.73_M2}
GLUCOSE BLD-MCNC: 95 MG/DL (ref 70–99)
HCT VFR BLD CALC: 47.3 % (ref 36–46)
HEMOGLOBIN: 15.4 G/DL (ref 12–16)
LYMPHOCYTES # BLD: 29 % (ref 24–44)
MCH RBC QN AUTO: 28.8 PG (ref 26–34)
MCHC RBC AUTO-ENTMCNC: 32.5 G/DL (ref 31–37)
MCV RBC AUTO: 88.7 FL (ref 80–100)
MONOCYTES # BLD: 7 % (ref 1–7)
PDW BLD-RTO: 13.5 % (ref 11.5–14.9)
PLATELET # BLD: 250 K/UL (ref 150–450)
PLATELET ESTIMATE: ABNORMAL
PMV BLD AUTO: 10 FL (ref 6–12)
POTASSIUM SERPL-SCNC: 4.3 MMOL/L (ref 3.7–5.3)
PRO-BNP: 863 PG/ML
RBC # BLD: 5.34 M/UL (ref 4–5.2)
RBC # BLD: ABNORMAL 10*6/UL
SEG NEUTROPHILS: 60 % (ref 36–66)
SEGMENTED NEUTROPHILS ABSOLUTE COUNT: 5.3 K/UL (ref 1.3–9.1)
SODIUM BLD-SCNC: 135 MMOL/L (ref 135–144)
WBC # BLD: 8.9 K/UL (ref 3.5–11)
WBC # BLD: ABNORMAL 10*3/UL

## 2017-03-31 PROCEDURE — 2580000003 HC RX 258

## 2017-03-31 PROCEDURE — 83880 ASSAY OF NATRIURETIC PEPTIDE: CPT

## 2017-03-31 PROCEDURE — 2580000003 HC RX 258: Performed by: INTERNAL MEDICINE

## 2017-03-31 PROCEDURE — 6370000000 HC RX 637 (ALT 250 FOR IP): Performed by: INTERNAL MEDICINE

## 2017-03-31 PROCEDURE — 80048 BASIC METABOLIC PNL TOTAL CA: CPT

## 2017-03-31 PROCEDURE — 6360000002 HC RX W HCPCS: Performed by: INTERNAL MEDICINE

## 2017-03-31 PROCEDURE — 85025 COMPLETE CBC W/AUTO DIFF WBC: CPT

## 2017-03-31 PROCEDURE — 36415 COLL VENOUS BLD VENIPUNCTURE: CPT

## 2017-03-31 PROCEDURE — 6360000002 HC RX W HCPCS

## 2017-03-31 RX ORDER — LISINOPRIL 2.5 MG/1
2.5 TABLET ORAL DAILY
Qty: 30 TABLET | Refills: 3
Start: 2017-03-31 | End: 2017-05-11 | Stop reason: ALTCHOICE

## 2017-03-31 RX ORDER — METOPROLOL SUCCINATE 50 MG/1
50 TABLET, EXTENDED RELEASE ORAL DAILY
Qty: 30 TABLET | Refills: 3 | Status: SHIPPED | OUTPATIENT
Start: 2017-03-31 | End: 2017-05-11 | Stop reason: DRUGHIGH

## 2017-03-31 RX ORDER — ALBUTEROL SULFATE 90 UG/1
1-2 AEROSOL, METERED RESPIRATORY (INHALATION) EVERY 6 HOURS PRN
Qty: 1 INHALER | Refills: 0 | Status: ON HOLD | OUTPATIENT
Start: 2017-03-31 | End: 2017-04-26

## 2017-03-31 RX ORDER — AMIODARONE HYDROCHLORIDE 400 MG/1
400 TABLET ORAL 2 TIMES DAILY
Qty: 30 TABLET | Refills: 3 | Status: SHIPPED | OUTPATIENT
Start: 2017-03-31 | End: 2017-04-06 | Stop reason: DRUGHIGH

## 2017-03-31 RX ORDER — LISINOPRIL 5 MG/1
2.5 TABLET ORAL DAILY
Status: DISCONTINUED | OUTPATIENT
Start: 2017-03-31 | End: 2017-03-31 | Stop reason: HOSPADM

## 2017-03-31 RX ORDER — ONDANSETRON 4 MG/1
4 TABLET, ORALLY DISINTEGRATING ORAL EVERY 8 HOURS PRN
Qty: 15 TABLET | Refills: 0 | Status: SHIPPED | OUTPATIENT
Start: 2017-03-31 | End: 2017-04-05

## 2017-03-31 RX ORDER — DOXYCYCLINE 100 MG/1
100 TABLET ORAL 2 TIMES DAILY
Qty: 20 TABLET | Refills: 0 | Status: SHIPPED | OUTPATIENT
Start: 2017-03-31 | End: 2017-04-10

## 2017-03-31 RX ADMIN — APIXABAN 5 MG: 5 TABLET, FILM COATED ORAL at 09:55

## 2017-03-31 RX ADMIN — AMIODARONE HYDROCHLORIDE 400 MG: 200 TABLET ORAL at 09:57

## 2017-03-31 RX ADMIN — LISINOPRIL 2.5 MG: 5 TABLET ORAL at 15:21

## 2017-03-31 RX ADMIN — AZITHROMYCIN MONOHYDRATE 500 MG: 500 INJECTION, POWDER, LYOPHILIZED, FOR SOLUTION INTRAVENOUS at 13:04

## 2017-03-31 RX ADMIN — Medication 10 ML: at 09:55

## 2017-03-31 RX ADMIN — CEFTRIAXONE SODIUM 1 G: 1 INJECTION, POWDER, FOR SOLUTION INTRAMUSCULAR; INTRAVENOUS at 09:57

## 2017-03-31 RX ADMIN — METOPROLOL SUCCINATE 50 MG: 50 TABLET, EXTENDED RELEASE ORAL at 10:09

## 2017-03-31 RX ADMIN — FUROSEMIDE 20 MG: 10 INJECTION, SOLUTION INTRAVENOUS at 09:57

## 2017-03-31 ASSESSMENT — PAIN SCALES - GENERAL: PAINLEVEL_OUTOF10: 0

## 2017-04-01 LAB
CULTURE: NORMAL
Lab: NORMAL
SPECIMEN DESCRIPTION: NORMAL
STATUS: NORMAL
STATUS: NORMAL

## 2017-04-04 ENCOUNTER — HOSPITAL ENCOUNTER (OUTPATIENT)
Age: 52
Discharge: HOME | End: 2017-04-04
Payer: COMMERCIAL

## 2017-04-04 LAB
ANION GAP SERPL CALCULATED.3IONS-SCNC: 17 MMOL/L (ref 9–17)
BUN BLDV-MCNC: 19 MG/DL (ref 6–20)
BUN/CREAT BLD: ABNORMAL (ref 9–20)
CALCIUM SERPL-MCNC: 9.6 MG/DL (ref 8.6–10.4)
CHLORIDE BLD-SCNC: 96 MMOL/L (ref 98–107)
CO2: 22 MMOL/L (ref 20–31)
CREAT SERPL-MCNC: 0.86 MG/DL (ref 0.5–0.9)
GFR AFRICAN AMERICAN: >60 ML/MIN
GFR NON-AFRICAN AMERICAN: >60 ML/MIN
GFR SERPL CREATININE-BSD FRML MDRD: ABNORMAL ML/MIN/{1.73_M2}
GFR SERPL CREATININE-BSD FRML MDRD: ABNORMAL ML/MIN/{1.73_M2}
GLUCOSE BLD-MCNC: 118 MG/DL (ref 70–99)
POTASSIUM SERPL-SCNC: 4.3 MMOL/L (ref 3.7–5.3)
SODIUM BLD-SCNC: 135 MMOL/L (ref 135–144)

## 2017-04-04 PROCEDURE — 36415 COLL VENOUS BLD VENIPUNCTURE: CPT

## 2017-04-04 PROCEDURE — 80048 BASIC METABOLIC PNL TOTAL CA: CPT

## 2017-04-07 LAB
EKG ATRIAL RATE: 357 BPM
EKG Q-T INTERVAL: 292 MS
EKG QRS DURATION: 74 MS
EKG QTC CALCULATION (BAZETT): 470 MS
EKG R AXIS: 93 DEGREES
EKG T AXIS: 100 DEGREES
EKG VENTRICULAR RATE: 156 BPM

## 2017-04-11 ENCOUNTER — HOSPITAL ENCOUNTER (OUTPATIENT)
Dept: SLEEP CENTER | Age: 52
Discharge: HOME | End: 2017-04-11
Payer: COMMERCIAL

## 2017-04-11 DIAGNOSIS — G47.33 OSA (OBSTRUCTIVE SLEEP APNEA): Primary | ICD-10-CM

## 2017-04-11 PROCEDURE — 95810 POLYSOM 6/> YRS 4/> PARAM: CPT

## 2017-04-12 VITALS
HEART RATE: 48 BPM | RESPIRATION RATE: 14 BRPM | HEIGHT: 68 IN | DIASTOLIC BLOOD PRESSURE: 60 MMHG | BODY MASS INDEX: 29.4 KG/M2 | SYSTOLIC BLOOD PRESSURE: 116 MMHG | WEIGHT: 194 LBS

## 2017-04-12 ASSESSMENT — SLEEP AND FATIGUE QUESTIONNAIRES
HOW LIKELY ARE YOU TO NOD OFF OR FALL ASLEEP WHILE SITTING QUIETLY AFTER LUNCH WITHOUT ALCOHOL: 0
NECK CIRCUMFERENCE (INCHES): 14.57
HOW LIKELY ARE YOU TO NOD OFF OR FALL ASLEEP WHILE SITTING INACTIVE IN A PUBLIC PLACE: 0
HOW LIKELY ARE YOU TO NOD OFF OR FALL ASLEEP WHILE SITTING AND READING: 2
HOW LIKELY ARE YOU TO NOD OFF OR FALL ASLEEP WHILE SITTING AND TALKING TO SOMEONE: 0
HOW LIKELY ARE YOU TO NOD OFF OR FALL ASLEEP IN A CAR, WHILE STOPPED FOR A FEW MINUTES IN TRAFFIC: 0
ESS TOTAL SCORE: 10
HOW LIKELY ARE YOU TO NOD OFF OR FALL ASLEEP WHILE WATCHING TV: 3
HOW LIKELY ARE YOU TO NOD OFF OR FALL ASLEEP WHILE LYING DOWN TO REST IN THE AFTERNOON WHEN CIRCUMSTANCES PERMIT: 2
HOW LIKELY ARE YOU TO NOD OFF OR FALL ASLEEP WHEN YOU ARE A PASSENGER IN A CAR FOR AN HOUR WITHOUT A BREAK: 3

## 2017-04-26 ENCOUNTER — APPOINTMENT (OUTPATIENT)
Dept: MRI IMAGING | Age: 52
End: 2017-04-26
Payer: COMMERCIAL

## 2017-04-26 ENCOUNTER — APPOINTMENT (OUTPATIENT)
Dept: CT IMAGING | Age: 52
End: 2017-04-26
Payer: COMMERCIAL

## 2017-04-26 ENCOUNTER — HOSPITAL ENCOUNTER (OUTPATIENT)
Age: 52
Setting detail: OBSERVATION
Discharge: OTHER INSTITUTION | End: 2017-04-26
Attending: EMERGENCY MEDICINE
Payer: COMMERCIAL

## 2017-04-26 VITALS
HEIGHT: 68 IN | BODY MASS INDEX: 28.23 KG/M2 | WEIGHT: 186.29 LBS | TEMPERATURE: 98.2 F | OXYGEN SATURATION: 99 % | HEART RATE: 54 BPM | RESPIRATION RATE: 16 BRPM | SYSTOLIC BLOOD PRESSURE: 114 MMHG | DIASTOLIC BLOOD PRESSURE: 68 MMHG

## 2017-04-26 DIAGNOSIS — G45.9 TRANSIENT CEREBRAL ISCHEMIA, UNSPECIFIED TYPE: ICD-10-CM

## 2017-04-26 DIAGNOSIS — R94.31 PROLONGED Q-T INTERVAL ON ECG: ICD-10-CM

## 2017-04-26 DIAGNOSIS — R20.0 LEFT ARM NUMBNESS: Primary | ICD-10-CM

## 2017-04-26 LAB
-: ABNORMAL
ABSOLUTE EOS #: 0.2 K/UL (ref 0–0.4)
ABSOLUTE LYMPH #: 3.8 K/UL (ref 1–4.8)
ABSOLUTE MONO #: 0.7 K/UL (ref 0.1–1.3)
ALBUMIN SERPL-MCNC: 4.5 G/DL (ref 3.5–5.2)
ALBUMIN/GLOBULIN RATIO: ABNORMAL (ref 1–2.5)
ALP BLD-CCNC: 55 U/L (ref 35–104)
ALT SERPL-CCNC: 41 U/L (ref 5–33)
AMORPHOUS: ABNORMAL
ANION GAP SERPL CALCULATED.3IONS-SCNC: 12 MMOL/L (ref 9–17)
AST SERPL-CCNC: 27 U/L
BACTERIA: ABNORMAL
BASOPHILS # BLD: 1 %
BASOPHILS ABSOLUTE: 0 K/UL (ref 0–0.2)
BILIRUB SERPL-MCNC: 0.44 MG/DL (ref 0.3–1.2)
BILIRUBIN DIRECT: 0.13 MG/DL
BILIRUBIN URINE: ABNORMAL
BILIRUBIN, INDIRECT: 0.31 MG/DL (ref 0–1)
BUN BLDV-MCNC: 20 MG/DL (ref 6–20)
BUN/CREAT BLD: ABNORMAL (ref 9–20)
CALCIUM SERPL-MCNC: 9.8 MG/DL (ref 8.6–10.4)
CASTS UA: ABNORMAL /LPF
CHLORIDE BLD-SCNC: 100 MMOL/L (ref 98–107)
CO2: 28 MMOL/L (ref 20–31)
COLOR: YELLOW
COMMENT UA: ABNORMAL
CREAT SERPL-MCNC: 0.95 MG/DL (ref 0.5–0.9)
CRYSTALS, UA: ABNORMAL /HPF
DIFFERENTIAL TYPE: NORMAL
EOSINOPHILS RELATIVE PERCENT: 2 %
EPITHELIAL CELLS UA: ABNORMAL /HPF
GFR AFRICAN AMERICAN: >60 ML/MIN
GFR NON-AFRICAN AMERICAN: >60 ML/MIN
GFR SERPL CREATININE-BSD FRML MDRD: ABNORMAL ML/MIN/{1.73_M2}
GFR SERPL CREATININE-BSD FRML MDRD: ABNORMAL ML/MIN/{1.73_M2}
GLOBULIN: ABNORMAL G/DL (ref 1.5–3.8)
GLUCOSE BLD-MCNC: 112 MG/DL (ref 70–99)
GLUCOSE BLD-MCNC: 89 MG/DL (ref 65–105)
GLUCOSE URINE: NEGATIVE
HCT VFR BLD CALC: 44.3 % (ref 36–46)
HEMOGLOBIN: 14.9 G/DL (ref 12–16)
INR BLD: 1.1
KETONES, URINE: NEGATIVE
LEUKOCYTE ESTERASE, URINE: ABNORMAL
LYMPHOCYTES # BLD: 43 %
MAGNESIUM: 2.2 MG/DL (ref 1.6–2.6)
MCH RBC QN AUTO: 29.4 PG (ref 26–34)
MCHC RBC AUTO-ENTMCNC: 33.7 G/DL (ref 31–37)
MCV RBC AUTO: 87.3 FL (ref 80–100)
MONOCYTES # BLD: 8 %
MUCUS: ABNORMAL
NITRITE, URINE: NEGATIVE
OTHER OBSERVATIONS UA: ABNORMAL
PDW BLD-RTO: 13.5 % (ref 11.5–14.9)
PH UA: 6 (ref 5–8)
PLATELET # BLD: 231 K/UL (ref 150–450)
PLATELET ESTIMATE: NORMAL
PMV BLD AUTO: 10.1 FL (ref 6–12)
POTASSIUM SERPL-SCNC: 4.4 MMOL/L (ref 3.7–5.3)
PROTEIN UA: NEGATIVE
PROTHROMBIN TIME: 11.7 SEC (ref 9.7–12)
RBC # BLD: 5.07 M/UL (ref 4–5.2)
RBC # BLD: NORMAL 10*6/UL
RBC UA: ABNORMAL /HPF
RENAL EPITHELIAL, UA: ABNORMAL /HPF
SEDIMENTATION RATE, ERYTHROCYTE: 2 MM (ref 0–20)
SEG NEUTROPHILS: 46 %
SEGMENTED NEUTROPHILS ABSOLUTE COUNT: 4.1 K/UL (ref 1.3–9.1)
SODIUM BLD-SCNC: 140 MMOL/L (ref 135–144)
SPECIFIC GRAVITY UA: 1.03 (ref 1–1.03)
TOTAL PROTEIN: 7.5 G/DL (ref 6.4–8.3)
TRICHOMONAS: ABNORMAL
TROPONIN INTERP: NORMAL
TROPONIN T: <0.03 NG/ML
TSH SERPL DL<=0.05 MIU/L-ACNC: 2.72 MIU/L (ref 0.3–5)
TURBIDITY: ABNORMAL
URINE HGB: NEGATIVE
UROBILINOGEN, URINE: NORMAL
WBC # BLD: 8.8 K/UL (ref 3.5–11)
WBC # BLD: NORMAL 10*3/UL
WBC UA: ABNORMAL /HPF
YEAST: ABNORMAL

## 2017-04-26 PROCEDURE — G0378 HOSPITAL OBSERVATION PER HR: HCPCS

## 2017-04-26 PROCEDURE — 70450 CT HEAD/BRAIN W/O DYE: CPT

## 2017-04-26 PROCEDURE — 85651 RBC SED RATE NONAUTOMATED: CPT

## 2017-04-26 PROCEDURE — 6360000004 HC RX CONTRAST MEDICATION: Performed by: PSYCHIATRY & NEUROLOGY

## 2017-04-26 PROCEDURE — 93308 TTE F-UP OR LMTD: CPT

## 2017-04-26 PROCEDURE — 6370000000 HC RX 637 (ALT 250 FOR IP): Performed by: INTERNAL MEDICINE

## 2017-04-26 PROCEDURE — 93005 ELECTROCARDIOGRAM TRACING: CPT

## 2017-04-26 PROCEDURE — 99285 EMERGENCY DEPT VISIT HI MDM: CPT

## 2017-04-26 PROCEDURE — 82947 ASSAY GLUCOSE BLOOD QUANT: CPT

## 2017-04-26 PROCEDURE — 70553 MRI BRAIN STEM W/O & W/DYE: CPT

## 2017-04-26 PROCEDURE — 84481 FREE ASSAY (FT-3): CPT

## 2017-04-26 PROCEDURE — 80076 HEPATIC FUNCTION PANEL: CPT

## 2017-04-26 PROCEDURE — 2580000003 HC RX 258: Performed by: PSYCHIATRY & NEUROLOGY

## 2017-04-26 PROCEDURE — 83735 ASSAY OF MAGNESIUM: CPT

## 2017-04-26 PROCEDURE — 81001 URINALYSIS AUTO W/SCOPE: CPT

## 2017-04-26 PROCEDURE — A9579 GAD-BASE MR CONTRAST NOS,1ML: HCPCS | Performed by: PSYCHIATRY & NEUROLOGY

## 2017-04-26 PROCEDURE — 85025 COMPLETE CBC W/AUTO DIFF WBC: CPT

## 2017-04-26 PROCEDURE — 84484 ASSAY OF TROPONIN QUANT: CPT

## 2017-04-26 PROCEDURE — 84443 ASSAY THYROID STIM HORMONE: CPT

## 2017-04-26 PROCEDURE — 80048 BASIC METABOLIC PNL TOTAL CA: CPT

## 2017-04-26 PROCEDURE — 85610 PROTHROMBIN TIME: CPT

## 2017-04-26 PROCEDURE — 36415 COLL VENOUS BLD VENIPUNCTURE: CPT

## 2017-04-26 RX ORDER — SODIUM CHLORIDE 0.9 % (FLUSH) 0.9 %
10 SYRINGE (ML) INJECTION PRN
Status: DISCONTINUED | OUTPATIENT
Start: 2017-04-26 | End: 2017-04-26 | Stop reason: HOSPADM

## 2017-04-26 RX ORDER — AMIODARONE HYDROCHLORIDE 200 MG/1
200 TABLET ORAL DAILY
Status: DISCONTINUED | OUTPATIENT
Start: 2017-04-26 | End: 2017-04-26 | Stop reason: HOSPADM

## 2017-04-26 RX ORDER — SODIUM CHLORIDE 0.9 % (FLUSH) 0.9 %
10 SYRINGE (ML) INJECTION EVERY 12 HOURS SCHEDULED
Status: DISCONTINUED | OUTPATIENT
Start: 2017-04-26 | End: 2017-04-26 | Stop reason: HOSPADM

## 2017-04-26 RX ORDER — FUROSEMIDE 40 MG/1
40 TABLET ORAL DAILY
Status: DISCONTINUED | OUTPATIENT
Start: 2017-04-26 | End: 2017-04-26 | Stop reason: HOSPADM

## 2017-04-26 RX ORDER — METOPROLOL SUCCINATE 50 MG/1
50 TABLET, EXTENDED RELEASE ORAL DAILY
Status: DISCONTINUED | OUTPATIENT
Start: 2017-04-26 | End: 2017-04-26 | Stop reason: HOSPADM

## 2017-04-26 RX ORDER — LISINOPRIL 2.5 MG/1
1.25 TABLET ORAL DAILY
Status: DISCONTINUED | OUTPATIENT
Start: 2017-04-26 | End: 2017-04-26 | Stop reason: HOSPADM

## 2017-04-26 RX ORDER — ALBUTEROL SULFATE 90 UG/1
2 AEROSOL, METERED RESPIRATORY (INHALATION) EVERY 6 HOURS PRN
COMMUNITY
End: 2019-01-17

## 2017-04-26 RX ORDER — ASPIRIN 325 MG
325 TABLET ORAL ONCE
Status: DISCONTINUED | OUTPATIENT
Start: 2017-04-26 | End: 2017-04-26 | Stop reason: HOSPADM

## 2017-04-26 RX ADMIN — VITAMIN D, TAB 1000IU (100/BT) 5000 UNITS: 25 TAB at 12:44

## 2017-04-26 RX ADMIN — Medication 10 ML: at 15:50

## 2017-04-26 RX ADMIN — LISINOPRIL 1.25 MG: 2.5 TABLET ORAL at 13:59

## 2017-04-26 RX ADMIN — FUROSEMIDE 40 MG: 40 TABLET ORAL at 12:44

## 2017-04-26 RX ADMIN — GADOPENTETATE DIMEGLUMINE 17 ML: 469.01 INJECTION INTRAVENOUS at 15:50

## 2017-04-26 RX ADMIN — APIXABAN 5 MG: 5 TABLET, FILM COATED ORAL at 13:59

## 2017-04-26 ASSESSMENT — PAIN SCALES - GENERAL: PAINLEVEL_OUTOF10: 0

## 2017-04-26 ASSESSMENT — ENCOUNTER SYMPTOMS
VOMITING: 0
NAUSEA: 0
BACK PAIN: 0
COUGH: 0
ABDOMINAL PAIN: 0
SHORTNESS OF BREATH: 0

## 2017-04-28 LAB
EKG ATRIAL RATE: 57 BPM
EKG P AXIS: 43 DEGREES
EKG P-R INTERVAL: 152 MS
EKG Q-T INTERVAL: 540 MS
EKG QRS DURATION: 84 MS
EKG QTC CALCULATION (BAZETT): 525 MS
EKG R AXIS: 33 DEGREES
EKG T AXIS: 56 DEGREES
EKG VENTRICULAR RATE: 57 BPM
T3 FREE: 2.57 PG/ML (ref 2.02–4.43)

## 2017-05-02 ENCOUNTER — HOSPITAL ENCOUNTER (OUTPATIENT)
Dept: SLEEP CENTER | Age: 52
Discharge: HOME | End: 2017-05-02
Payer: COMMERCIAL

## 2017-05-02 DIAGNOSIS — G47.33 OSA (OBSTRUCTIVE SLEEP APNEA): Primary | ICD-10-CM

## 2017-05-02 PROCEDURE — 95811 POLYSOM 6/>YRS CPAP 4/> PARM: CPT

## 2017-06-20 ENCOUNTER — HOSPITAL ENCOUNTER (OUTPATIENT)
Age: 52
Setting detail: SPECIMEN
Discharge: HOME | End: 2017-06-20
Payer: COMMERCIAL

## 2017-06-20 LAB
BUN BLDV-MCNC: 15 MG/DL (ref 6–20)
CREAT SERPL-MCNC: 0.73 MG/DL (ref 0.5–0.9)
GFR AFRICAN AMERICAN: >60 ML/MIN
GFR NON-AFRICAN AMERICAN: >60 ML/MIN
GFR SERPL CREATININE-BSD FRML MDRD: NORMAL ML/MIN/{1.73_M2}
GFR SERPL CREATININE-BSD FRML MDRD: NORMAL ML/MIN/{1.73_M2}

## 2017-12-22 ENCOUNTER — OFFICE VISIT (OUTPATIENT)
Dept: OBGYN CLINIC | Age: 52
End: 2017-12-22
Payer: COMMERCIAL

## 2017-12-22 VITALS
SYSTOLIC BLOOD PRESSURE: 110 MMHG | DIASTOLIC BLOOD PRESSURE: 78 MMHG | HEIGHT: 68 IN | WEIGHT: 183 LBS | BODY MASS INDEX: 27.74 KG/M2

## 2017-12-22 DIAGNOSIS — Z12.39 SCREENING FOR BREAST CANCER: ICD-10-CM

## 2017-12-22 DIAGNOSIS — Z01.419 WELL WOMAN EXAM: Primary | ICD-10-CM

## 2017-12-22 DIAGNOSIS — Z90.710 H/O: HYSTERECTOMY: ICD-10-CM

## 2017-12-22 PROCEDURE — 99396 PREV VISIT EST AGE 40-64: CPT | Performed by: NURSE PRACTITIONER

## 2017-12-22 ASSESSMENT — PATIENT HEALTH QUESTIONNAIRE - PHQ9
SUM OF ALL RESPONSES TO PHQ9 QUESTIONS 1 & 2: 0
SUM OF ALL RESPONSES TO PHQ QUESTIONS 1-9: 0
2. FEELING DOWN, DEPRESSED OR HOPELESS: 0
1. LITTLE INTEREST OR PLEASURE IN DOING THINGS: 0

## 2017-12-22 NOTE — PROGRESS NOTES
History and Physical  [address], [address] [facility] [address], [address] [phone]   Fax [phone]  Gisela Hager  2017              47 y.o. Chief Complaint   Patient presents with    Gynecologic Exam       No LMP recorded. Patient has had a hysterectomy. Primary Care Physician: Oniel Marroquin MD    The patient was seen and examined. She has no chief complaint today and is here for her annual exam.  Her bowels are regular. There are no voiding complaints. She denies any bloating. She denies vaginal discharge and was counseled on STD's and the need for barrier contraception.      HPI : Gisela Hager is a 47 y.o. female     Annual exam, no chief complaint  ________________________________________________________________________  Obstetric History       T1      L1     SAB0   TAB0   Ectopic0   Molar0   Multiple0   Live Births1       # Outcome Date GA Lbr Acosta/2nd Weight Sex Delivery Anes PTL Lv   3 SAB            2 SAB            1 Term    8 lb 7 oz (3.827 kg) F Vag-Spont   ZOEY        Past Medical History:   Diagnosis Date    Atrial fibrillation ([facility] [address]     Syncope     Vision abnormalities     glasses                                                                   Past Surgical History:   Procedure Laterality Date    APPENDECTOMY      CARDIOVERSION  2017    LILLY    CARDIOVERSION N/A 3/30/2017    CARDIOVERSION performed by Dahiana Howell MD at [address] [place] [address] [address]  2017    With Dr. Rich Ponce COLONOSCOPY  2/21/15    BIOPSY    HYSTERECTOMY  2010    PILONIDAL CYST EXCISION      TONSILLECTOMY AND ADENOIDECTOMY      TRANSESOPHAGEAL ECHOCARDIOGRAM N/A 3/30/2017    TRANSESOPHAGEAL ECHOCARDIOGRAM performed by Dahiana Howell MD at [facility] History   Problem Relation Age of Onset    Diabetes Sister     Breast Cancer Neg Hx     Cancer Neg Hx     Colon Cancer Neg Hx     Eclampsia Neg Hx Appearance: This  is a well Developed, well Nourished, well groomed female. Her BMI was reviewed. Nutritional decision making was discussed. Skin:  There was a Normal Inspection of the skin without rashes or lesions. There were no rashes. (Papular, Maculopapular, Hives, Pustular, Macular)     There were no lesions (Ulcers, Erythema, Abn. Appearing Nevi)            Lymphatic:  No Lymph Nodes were Palpable in the neck , axilla or groin.  0 # Of Lymph Nodes; Location ; Character [Normal]  [Shotty] [Tender] [Enlarged]     Neck and EENT:  The neck was supple. There were no masses   The thyroid was not enlarged and had no masses. Perrla, EOMI B/L, TMI B/L No Abnormalities. Throat inspected-No exudates or Masses, Nares Patent No Masses        Respiratory: The lungs were auscultated and found to be clear. There were no rales, rhonchi or wheezes. There was a good respiratory effort. Cardiovascular: The heart was in a regular rate and rhythm. . No S3 or S4. There was no murmur appreciated. Location, grade, and radiation are not applicable. Extremities: The patients extremities were without calf tenderness, edema, or varicosities. There was full range of motion in all four extremities. Pulses in all four extremities were appreciated and are 2/4. Abdomen: The abdomen was soft and non-tender. There were good bowel sounds in all quadrants and there was no guarding, rebound or rigidity. On evaluation there was no evidence of hepatosplenomegaly and there was no costal vertebral lesa tenderness bilaterally. No hernias were appreciated. Abdominal Scars: appendectomy and hyst scar    Psych: The patient had a normal Orientation to: Time, Place, Person, and Situation  There is no Mood / Affect changes    Breast:  (Chest)  normal appearance, no masses or tenderness. Hair growth noted around bilateral nipples. Self breast exams were reviewed in detail. Literature was given.     Pelvic Exam:  Vulva and vagina appear normal. Bimanual exam reveals normal cuff intact. Rectal Exam:  exam declined by patient          Musculosk:  Normal Gait and station was noted. Digits were evaluated without abnormal findings. Range of motion, stability and strength were evaluated and found to be appropriate for the patients age. ASSESSMENT:      47 y.o. Annual  1. Well woman exam  JACINTA DIGITAL SCREEN W OR WO CAD BILATERAL   2. H/O: hysterectomy     3. Screening for breast cancer  JACINTA DIGITAL SCREEN W OR WO CAD BILATERAL          Chief Complaint   Patient presents with    Gynecologic Exam          Past Medical History:   Diagnosis Date    Atrial fibrillation ([address]     Syncope     Vision abnormalities     glasses         Patient Active Problem List    Diagnosis Date Noted    H/O: hysterectomy 12/22/2017     Hysterectomy for heavy menses- denies history of abnormal pap smears      Atrial flutter ([address] 03/26/2017    Cough with sputum 10/20/2016    Palpitation 03/30/2016    Vision abnormalities      glasses            Hereditary Breast, Ovarian, Colon and Uterine Cancer screening Done. Tobacco & Secondary smoke risks reviewed; instructed on cessation and avoidance      Counseling Completed:  Preventative Health Recommendations and Follow up. The patient was informed of the recommended preventative health recommendations. 1. Annuals every year; Cytology collections per prevailing guidelines. 2. Mammograms begin every year at 35 yo if no abnormalities are found and no family     History. 3. Bone density studies every 2-3 years. Begin at 71 yo. If no fracture history or osteoporosis family history. (significant). 4. Colonoscopy begin at 47 yo. Repeat every ten years if negative and no family history. 5. Calcium of 6979-5625 mg/day in split dosing  6. Vitamin D 400-800 IU/day  7.  All other preventative health recommendations will be managed by the patients Primary care

## 2018-01-03 ENCOUNTER — HOSPITAL ENCOUNTER (OUTPATIENT)
Age: 53
Setting detail: SPECIMEN
Discharge: HOME | End: 2018-01-03
Payer: COMMERCIAL

## 2018-01-03 DIAGNOSIS — Z00.00 ANNUAL PHYSICAL EXAM: ICD-10-CM

## 2018-01-03 LAB
ABSOLUTE EOS #: 0.16 K/UL (ref 0–0.44)
ABSOLUTE IMMATURE GRANULOCYTE: <0.03 K/UL (ref 0–0.3)
ABSOLUTE LYMPH #: 2.06 K/UL (ref 1.1–3.7)
ABSOLUTE MONO #: 0.42 K/UL (ref 0.1–1.2)
ALBUMIN SERPL-MCNC: 4.5 G/DL (ref 3.5–5.2)
ALBUMIN/GLOBULIN RATIO: 1.9 (ref 1–2.5)
ALP BLD-CCNC: 77 U/L (ref 35–104)
ALT SERPL-CCNC: 33 U/L (ref 5–33)
ANION GAP SERPL CALCULATED.3IONS-SCNC: 14 MMOL/L (ref 9–17)
AST SERPL-CCNC: 22 U/L
BASOPHILS # BLD: 1 % (ref 0–2)
BASOPHILS ABSOLUTE: 0.03 K/UL (ref 0–0.2)
BILIRUB SERPL-MCNC: 0.33 MG/DL (ref 0.3–1.2)
BUN BLDV-MCNC: 20 MG/DL (ref 6–20)
BUN/CREAT BLD: NORMAL (ref 9–20)
CALCIUM SERPL-MCNC: 8.9 MG/DL (ref 8.6–10.4)
CHLORIDE BLD-SCNC: 103 MMOL/L (ref 98–107)
CHOLESTEROL/HDL RATIO: 3
CHOLESTEROL: 228 MG/DL
CO2: 25 MMOL/L (ref 20–31)
CREAT SERPL-MCNC: 0.72 MG/DL (ref 0.5–0.9)
DIFFERENTIAL TYPE: NORMAL
EOSINOPHILS RELATIVE PERCENT: 3 % (ref 1–4)
GFR AFRICAN AMERICAN: >60 ML/MIN
GFR NON-AFRICAN AMERICAN: >60 ML/MIN
GFR SERPL CREATININE-BSD FRML MDRD: NORMAL ML/MIN/{1.73_M2}
GFR SERPL CREATININE-BSD FRML MDRD: NORMAL ML/MIN/{1.73_M2}
GLUCOSE BLD-MCNC: 97 MG/DL (ref 70–99)
HCT VFR BLD CALC: 43.2 % (ref 36.3–47.1)
HDLC SERPL-MCNC: 77 MG/DL
HEMOGLOBIN: 13.7 G/DL (ref 11.9–15.1)
IMMATURE GRANULOCYTES: 0 %
LDL CHOLESTEROL: 138 MG/DL (ref 0–130)
LYMPHOCYTES # BLD: 37 % (ref 24–43)
MCH RBC QN AUTO: 29.2 PG (ref 25.2–33.5)
MCHC RBC AUTO-ENTMCNC: 31.7 G/DL (ref 28.4–34.8)
MCV RBC AUTO: 92.1 FL (ref 82.6–102.9)
MONOCYTES # BLD: 8 % (ref 3–12)
PDW BLD-RTO: 12.7 % (ref 11.8–14.4)
PLATELET # BLD: 247 K/UL (ref 138–453)
PLATELET ESTIMATE: NORMAL
PMV BLD AUTO: 11.4 FL (ref 8.1–13.5)
POTASSIUM SERPL-SCNC: 4.7 MMOL/L (ref 3.7–5.3)
RBC # BLD: 4.69 M/UL (ref 3.95–5.11)
RBC # BLD: NORMAL 10*6/UL
SEG NEUTROPHILS: 51 % (ref 36–65)
SEGMENTED NEUTROPHILS ABSOLUTE COUNT: 2.85 K/UL (ref 1.5–8.1)
SODIUM BLD-SCNC: 142 MMOL/L (ref 135–144)
TOTAL PROTEIN: 6.9 G/DL (ref 6.4–8.3)
TRIGL SERPL-MCNC: 63 MG/DL
TSH SERPL DL<=0.05 MIU/L-ACNC: 1.57 MIU/L (ref 0.3–5)
VLDLC SERPL CALC-MCNC: ABNORMAL MG/DL (ref 1–30)
WBC # BLD: 5.5 K/UL (ref 3.5–11.3)
WBC # BLD: NORMAL 10*3/UL

## 2018-01-15 ENCOUNTER — HOSPITAL ENCOUNTER (OUTPATIENT)
Dept: WOMENS IMAGING | Age: 53
Discharge: HOME | End: 2018-01-15
Payer: COMMERCIAL

## 2018-01-15 DIAGNOSIS — Z12.39 SCREENING FOR BREAST CANCER: ICD-10-CM

## 2018-01-15 DIAGNOSIS — Z01.419 WELL WOMAN EXAM: ICD-10-CM

## 2018-01-15 PROCEDURE — 77063 BREAST TOMOSYNTHESIS BI: CPT

## 2018-01-16 PROBLEM — I47.29 NONSUSTAINED VENTRICULAR TACHYCARDIA (HCC): Status: ACTIVE | Noted: 2017-04-26

## 2018-01-16 PROBLEM — I51.9 LEFT VENTRICULAR DYSFUNCTION: Status: ACTIVE | Noted: 2017-04-06

## 2018-01-17 ENCOUNTER — HOSPITAL ENCOUNTER (EMERGENCY)
Age: 53
Discharge: HOME | End: 2018-01-17
Attending: EMERGENCY MEDICINE
Payer: OTHER MISCELLANEOUS

## 2018-01-17 VITALS
HEART RATE: 85 BPM | SYSTOLIC BLOOD PRESSURE: 131 MMHG | BODY MASS INDEX: 27.74 KG/M2 | DIASTOLIC BLOOD PRESSURE: 72 MMHG | WEIGHT: 183 LBS | OXYGEN SATURATION: 99 % | TEMPERATURE: 97.7 F | HEIGHT: 68 IN

## 2018-01-17 DIAGNOSIS — S16.1XXA ACUTE STRAIN OF NECK MUSCLE, INITIAL ENCOUNTER: Primary | ICD-10-CM
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
## 2018-01-17 PROCEDURE — 99283 EMERGENCY DEPT VISIT LOW MDM: CPT

## 2018-01-17 ASSESSMENT — ENCOUNTER SYMPTOMS
EYE PAIN: 0
EYE REDNESS: 0
ABDOMINAL PAIN: 0
NAUSEA: 0
SHORTNESS OF BREATH: 0
VOMITING: 0
COUGH: 0
RHINORRHEA: 0
BACK PAIN: 0

## 2018-01-17 ASSESSMENT — PAIN SCALES - GENERAL: PAINLEVEL_OUTOF10: 5

## 2018-01-17 ASSESSMENT — PAIN DESCRIPTION - LOCATION: LOCATION: NECK
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
## 2018-04-23 ENCOUNTER — OFFICE VISIT (OUTPATIENT)
Dept: OBGYN CLINIC | Age: 53
End: 2018-04-23
Payer: COMMERCIAL

## 2018-04-23 VITALS
SYSTOLIC BLOOD PRESSURE: 112 MMHG | HEART RATE: 78 BPM | BODY MASS INDEX: 28.95 KG/M2 | HEIGHT: 68 IN | WEIGHT: 191 LBS | DIASTOLIC BLOOD PRESSURE: 78 MMHG

## 2018-04-23 DIAGNOSIS — N64.4 BREAST PAIN: ICD-10-CM

## 2018-04-23 DIAGNOSIS — N63.22 BREAST LUMP ON LEFT SIDE AT 11 O'CLOCK POSITION: Primary | ICD-10-CM

## 2018-04-23 PROCEDURE — 3017F COLORECTAL CA SCREEN DOC REV: CPT | Performed by: NURSE PRACTITIONER

## 2018-04-23 PROCEDURE — 1036F TOBACCO NON-USER: CPT | Performed by: NURSE PRACTITIONER

## 2018-04-23 PROCEDURE — 99213 OFFICE O/P EST LOW 20 MIN: CPT | Performed by: NURSE PRACTITIONER

## 2018-04-23 PROCEDURE — G8417 CALC BMI ABV UP PARAM F/U: HCPCS | Performed by: NURSE PRACTITIONER

## 2018-04-23 PROCEDURE — G8427 DOCREV CUR MEDS BY ELIG CLIN: HCPCS | Performed by: NURSE PRACTITIONER

## 2018-05-04 ENCOUNTER — HOSPITAL ENCOUNTER (OUTPATIENT)
Dept: WOMENS IMAGING | Age: 53
Discharge: HOME | End: 2018-05-06
Payer: COMMERCIAL

## 2018-05-04 DIAGNOSIS — N64.4 BREAST PAIN: ICD-10-CM

## 2018-05-04 DIAGNOSIS — N63.22 BREAST LUMP ON LEFT SIDE AT 11 O'CLOCK POSITION: ICD-10-CM

## 2018-05-04 PROCEDURE — 76642 ULTRASOUND BREAST LIMITED: CPT

## 2018-05-04 PROCEDURE — G0279 TOMOSYNTHESIS, MAMMO: HCPCS

## 2018-07-16 PROBLEM — R06.81 APNEA: Status: ACTIVE | Noted: 2018-07-16

## 2019-01-17 ENCOUNTER — OFFICE VISIT (OUTPATIENT)
Dept: OBGYN CLINIC | Age: 54
End: 2019-01-17
Payer: COMMERCIAL

## 2019-01-17 VITALS
HEART RATE: 80 BPM | SYSTOLIC BLOOD PRESSURE: 116 MMHG | HEIGHT: 68 IN | WEIGHT: 193 LBS | BODY MASS INDEX: 29.25 KG/M2 | DIASTOLIC BLOOD PRESSURE: 82 MMHG

## 2019-01-17 DIAGNOSIS — Z12.31 ENCOUNTER FOR SCREENING MAMMOGRAM FOR BREAST CANCER: ICD-10-CM

## 2019-01-17 DIAGNOSIS — Z01.419 PAP SMEAR, AS PART OF ROUTINE GYNECOLOGICAL EXAMINATION: Primary | ICD-10-CM

## 2019-01-17 PROCEDURE — 99396 PREV VISIT EST AGE 40-64: CPT | Performed by: NURSE PRACTITIONER

## 2019-01-17 PROCEDURE — G8482 FLU IMMUNIZE ORDER/ADMIN: HCPCS | Performed by: NURSE PRACTITIONER

## 2019-01-17 ASSESSMENT — PATIENT HEALTH QUESTIONNAIRE - PHQ9
1. LITTLE INTEREST OR PLEASURE IN DOING THINGS: 0
SUM OF ALL RESPONSES TO PHQ QUESTIONS 1-9: 0
2. FEELING DOWN, DEPRESSED OR HOPELESS: 0
SUM OF ALL RESPONSES TO PHQ QUESTIONS 1-9: 0
SUM OF ALL RESPONSES TO PHQ9 QUESTIONS 1 & 2: 0

## 2019-02-09 ENCOUNTER — HOSPITAL ENCOUNTER (OUTPATIENT)
Dept: WOMENS IMAGING | Age: 54
Discharge: HOME | End: 2019-02-11
Payer: COMMERCIAL

## 2019-02-09 DIAGNOSIS — Z12.31 ENCOUNTER FOR SCREENING MAMMOGRAM FOR BREAST CANCER: ICD-10-CM

## 2019-02-09 PROCEDURE — 77067 SCR MAMMO BI INCL CAD: CPT

## 2019-04-18 ENCOUNTER — HOSPITAL ENCOUNTER (OUTPATIENT)
Age: 54
Setting detail: SPECIMEN
Discharge: HOME | End: 2019-04-18
Payer: COMMERCIAL

## 2019-04-18 LAB
ALBUMIN SERPL-MCNC: 4.6 G/DL (ref 3.5–5.2)
ALBUMIN/GLOBULIN RATIO: 1.9 (ref 1–2.5)
ALP BLD-CCNC: 65 U/L (ref 35–104)
ALT SERPL-CCNC: 29 U/L (ref 5–33)
ANION GAP SERPL CALCULATED.3IONS-SCNC: 17 MMOL/L (ref 9–17)
AST SERPL-CCNC: 23 U/L
BILIRUB SERPL-MCNC: 0.34 MG/DL (ref 0.3–1.2)
BUN BLDV-MCNC: 18 MG/DL (ref 6–20)
BUN/CREAT BLD: ABNORMAL (ref 9–20)
CALCIUM SERPL-MCNC: 9.3 MG/DL (ref 8.6–10.4)
CHLORIDE BLD-SCNC: 109 MMOL/L (ref 98–107)
CHOLESTEROL, FASTING: 202 MG/DL
CHOLESTEROL/HDL RATIO: 3.3
CO2: 23 MMOL/L (ref 20–31)
CREAT SERPL-MCNC: 0.61 MG/DL (ref 0.5–0.9)
GFR AFRICAN AMERICAN: >60 ML/MIN
GFR NON-AFRICAN AMERICAN: >60 ML/MIN
GFR SERPL CREATININE-BSD FRML MDRD: ABNORMAL ML/MIN/{1.73_M2}
GFR SERPL CREATININE-BSD FRML MDRD: ABNORMAL ML/MIN/{1.73_M2}
GLUCOSE BLD-MCNC: 107 MG/DL (ref 70–99)
HDLC SERPL-MCNC: 62 MG/DL
LDL CHOLESTEROL: 128 MG/DL (ref 0–130)
POTASSIUM SERPL-SCNC: 4.6 MMOL/L (ref 3.7–5.3)
SODIUM BLD-SCNC: 149 MMOL/L (ref 135–144)
TOTAL PROTEIN: 7 G/DL (ref 6.4–8.3)
TRIGLYCERIDE, FASTING: 61 MG/DL
VITAMIN D 25-HYDROXY: 41.4 NG/ML (ref 30–100)
VLDLC SERPL CALC-MCNC: ABNORMAL MG/DL (ref 1–30)

## 2019-10-31 PROBLEM — Z95.818 STATUS POST PLACEMENT OF IMPLANTABLE LOOP RECORDER: Status: ACTIVE | Noted: 2017-12-20

## 2019-10-31 PROBLEM — H53.9 ABNORMAL VISION: Status: ACTIVE | Noted: 2017-09-11

## 2019-10-31 PROBLEM — I48.0 PAROXYSMAL ATRIAL FIBRILLATION (HCC): Status: ACTIVE | Noted: 2017-04-26

## 2019-10-31 PROBLEM — I51.9 CHRONIC SYSTOLIC DYSFUNCTION OF LEFT VENTRICLE: Status: ACTIVE | Noted: 2017-04-06

## 2020-01-20 ENCOUNTER — OFFICE VISIT (OUTPATIENT)
Dept: OBGYN CLINIC | Age: 55
End: 2020-01-20
Payer: COMMERCIAL

## 2020-01-20 VITALS
HEIGHT: 68 IN | SYSTOLIC BLOOD PRESSURE: 120 MMHG | WEIGHT: 194 LBS | DIASTOLIC BLOOD PRESSURE: 74 MMHG | BODY MASS INDEX: 29.4 KG/M2

## 2020-01-20 PROCEDURE — 99396 PREV VISIT EST AGE 40-64: CPT | Performed by: NURSE PRACTITIONER

## 2020-01-20 PROCEDURE — G8484 FLU IMMUNIZE NO ADMIN: HCPCS | Performed by: NURSE PRACTITIONER

## 2020-01-20 ASSESSMENT — PATIENT HEALTH QUESTIONNAIRE - PHQ9
SUM OF ALL RESPONSES TO PHQ QUESTIONS 1-9: 0
SUM OF ALL RESPONSES TO PHQ9 QUESTIONS 1 & 2: 0
1. LITTLE INTEREST OR PLEASURE IN DOING THINGS: 0
2. FEELING DOWN, DEPRESSED OR HOPELESS: 0
SUM OF ALL RESPONSES TO PHQ QUESTIONS 1-9: 0

## 2020-01-20 NOTE — PROGRESS NOTES
History and Physical  [address], [address] [facility] [address], [address] [phone]   Fax [phone]  Layo Lai  2020              53 y.o. Chief Complaint   Patient presents with    Gynecologic Exam       No LMP recorded. Patient has had a hysterectomy. Primary Care Physician: Ilana Evans MD    The patient was seen and examined. She has no chief complaint today and is here for her annual exam.  Her bowels are regular. There are no voiding complaints. She denies any bloating. She denies vaginal discharge and was counseled on STD's and the need for barrier contraception. HPI : Layo Lai is a 53 y.o. female [id number]    Annual exam  Hx of hyst in  for heavy menses  Denies hx of abnormal pap smears.   No chief complaint  ________________________________________________________________________  OB History    Para Term  AB Living   3 1 1 0 2 1   SAB TAB Ectopic Molar Multiple Live Births   2 0 0 0 0 1      # Outcome Date GA Lbr Acosta/2nd Weight Sex Delivery Anes PTL Lv   3 SAB            2 SAB            1 Term    8 lb 7 oz (3.827 kg) F Vag-Spont   ZOEY     Past Medical History:   Diagnosis Date    Atrial fibrillation ([address]     Syncope     Vision abnormalities     glasses                                                                   Past Surgical History:   Procedure Laterality Date    APPENDECTOMY  1974    CARDIOVERSION  2017    LILLY    CARDIOVERSION N/A 3/30/2017    CARDIOVERSION performed by Guevara Jeff MD at [address] [facility] [address]  2017    With Dr. Felix Issa COLONOSCOPY  2/21/15    BIOPSY    HYSTERECTOMY  2010    PILONIDAL CYST EXCISION      TONSILLECTOMY AND ADENOIDECTOMY      TRANSESOPHAGEAL ECHOCARDIOGRAM N/A 3/30/2017    TRANSESOPHAGEAL ECHOCARDIOGRAM performed by Guevara Jeff MD at [address] [place] [address] History   Problem Relation Age of Onset    Diabetes Sister     Breast Cancer Neg Hx     Cancer Neg Hx     Colon Cancer Neg Hx     Eclampsia Neg Hx     Hypertension Neg Hx     Ovarian Cancer Neg Hx      Labor Neg Hx     Spont Abortions Neg Hx     Stroke Neg Hx      Social History     Socioeconomic History    Marital status:      Spouse name: Not on file    Number of children: Not on file    Years of education: Not on file    Highest education level: Not on file   Occupational History    Not on file   Social Needs    Financial resource strain: Not on file    Food insecurity:     Worry: Not on file     Inability: Not on file    Transportation needs:     Medical: Not on file     Non-medical: Not on file   Tobacco Use    Smoking status: Never Smoker    Smokeless tobacco: Never Used   Substance and Sexual Activity    Alcohol use:  Yes     Alcohol/week: 0.0 standard drinks     Comment: OCC    Drug use: No    Sexual activity: Yes     Partners: Male     Birth control/protection: Surgical     Comment: hyst   Lifestyle    Physical activity:     Days per week: Not on file     Minutes per session: Not on file    Stress: Not on file   Relationships    Social connections:     Talks on phone: Not on file     Gets together: Not on file     Attends Synagogue service: Not on file     Active member of club or organization: Not on file     Attends meetings of clubs or organizations: Not on file     Relationship status: Not on file    Intimate partner violence:     Fear of current or ex partner: Not on file     Emotionally abused: Not on file     Physically abused: Not on file     Forced sexual activity: Not on file   Other Topics Concern    Not on file   Social History Narrative    Not on file       MEDICATIONS:  Current Outpatient Medications   Medication Sig Dispense Refill    Azelaic Acid 15 % GEL APPLY TO FACE ONCE A DAY as directed by prescriber  0    Cholecalciferol (VITAMIN D3) 5000 UNITS TABS Take 1 each by mouth daily       No current Pneumoniae,Cough, or Pulmonary Embolism History  Cardiovascular ROS: No Chest Pain with Exertion, Palpitations, Syncope, Edema, Arrhythmia. +Hx cardiac ablation  Gastrointestinal ROS: No Indigestion, Heartburn, Nausea, vomiting, Diarrhea, Constipation,or Bowel Changes; No Bloody Stools or melena  Genito-Urinary ROS: No Dysuria, Hematuria or Nocturia. No Urinary Incontinence or Vaginal Discharge  Musculoskeletal ROS: No Arthralgia, Arthritis,Gout,Osteoporosis or Rheumatism  Neurological ROS: No CVA, Migraines, Epilepsy, Seizure Hx, or Limb Weakness  Dermatological ROS: No Rash, Itching, Hives, Mole Changes or Cancer                                                                                                                                                                                                                                  PHYSICAL Exam:     Constitutional:  Vitals:    01/20/20 1628   BP: 120/74   Site: Right Upper Arm   Position: Sitting   Cuff Size: Medium Adult   Weight: 194 lb (88 kg)   Height: 5' 8\" (1.727 m)         General Appearance: This  is a well Developed, well Nourished, well groomed female. Her BMI was reviewed. Nutritional decision making was discussed. Skin:  There was a Normal Inspection of the skin without rashes or lesions. There were no rashes. (Papular, Maculopapular, Hives, Pustular, Macular)     There were no lesions (Ulcers, Erythema, Abn. Appearing Nevi)            Lymphatic:  No Lymph Nodes were Palpable in the neck , axilla or groin.  0 # Of Lymph Nodes; Location ; Character [Normal]  [Shotty] [Tender] [Enlarged]     Neck and EENT:  The neck was supple. There were no masses   The thyroid was not enlarged and had no masses. Perrla, EOMI B/L, TMI B/L No Abnormalities. Throat inspected-No exudates or Masses, Nares Patent No Masses        Respiratory: The lungs were auscultated and found to be clear. There were no rales, rhonchi or wheezes.  There was a good

## 2020-02-24 ENCOUNTER — HOSPITAL ENCOUNTER (OUTPATIENT)
Dept: WOMENS IMAGING | Age: 55
Discharge: HOME | End: 2020-02-26
Payer: COMMERCIAL

## 2020-02-24 PROCEDURE — 77063 BREAST TOMOSYNTHESIS BI: CPT

## 2020-08-13 ENCOUNTER — HOSPITAL ENCOUNTER (OUTPATIENT)
Age: 55
Setting detail: SPECIMEN
Discharge: HOME | End: 2020-08-13
Payer: COMMERCIAL

## 2020-08-13 LAB
ALBUMIN SERPL-MCNC: 4.6 G/DL (ref 3.5–5.2)
ALBUMIN/GLOBULIN RATIO: 1.8 (ref 1–2.5)
ALP BLD-CCNC: 71 U/L (ref 35–104)
ALT SERPL-CCNC: 20 U/L (ref 5–33)
ANION GAP SERPL CALCULATED.3IONS-SCNC: 11 MMOL/L (ref 9–17)
AST SERPL-CCNC: 20 U/L
BILIRUB SERPL-MCNC: 0.42 MG/DL (ref 0.3–1.2)
BUN BLDV-MCNC: 21 MG/DL (ref 6–20)
BUN/CREAT BLD: ABNORMAL (ref 9–20)
CALCIUM SERPL-MCNC: 9.9 MG/DL (ref 8.6–10.4)
CHLORIDE BLD-SCNC: 101 MMOL/L (ref 98–107)
CHOLESTEROL/HDL RATIO: 3.7
CHOLESTEROL: 224 MG/DL
CO2: 28 MMOL/L (ref 20–31)
CREAT SERPL-MCNC: 0.74 MG/DL (ref 0.5–0.9)
GFR AFRICAN AMERICAN: >60 ML/MIN
GFR NON-AFRICAN AMERICAN: >60 ML/MIN
GFR SERPL CREATININE-BSD FRML MDRD: ABNORMAL ML/MIN/{1.73_M2}
GFR SERPL CREATININE-BSD FRML MDRD: ABNORMAL ML/MIN/{1.73_M2}
GLUCOSE BLD-MCNC: 97 MG/DL (ref 70–99)
HCT VFR BLD CALC: 44.3 % (ref 36.3–47.1)
HDLC SERPL-MCNC: 61 MG/DL
HEMOGLOBIN: 14.1 G/DL (ref 11.9–15.1)
LDL CHOLESTEROL: 146 MG/DL (ref 0–130)
MCH RBC QN AUTO: 29.1 PG (ref 25.2–33.5)
MCHC RBC AUTO-ENTMCNC: 31.8 G/DL (ref 28.4–34.8)
MCV RBC AUTO: 91.3 FL (ref 82.6–102.9)
NRBC AUTOMATED: 0 PER 100 WBC
PDW BLD-RTO: 12.3 % (ref 11.8–14.4)
PLATELET # BLD: 229 K/UL (ref 138–453)
PMV BLD AUTO: 12 FL (ref 8.1–13.5)
POTASSIUM SERPL-SCNC: 5.1 MMOL/L (ref 3.7–5.3)
RBC # BLD: 4.85 M/UL (ref 3.95–5.11)
SODIUM BLD-SCNC: 140 MMOL/L (ref 135–144)
TOTAL PROTEIN: 7.1 G/DL (ref 6.4–8.3)
TRIGL SERPL-MCNC: 83 MG/DL
TSH SERPL DL<=0.05 MIU/L-ACNC: 1.22 MIU/L (ref 0.3–5)
VITAMIN D 25-HYDROXY: 42.6 NG/ML (ref 30–100)
VLDLC SERPL CALC-MCNC: ABNORMAL MG/DL (ref 1–30)
WBC # BLD: 4.9 K/UL (ref 3.5–11.3)

## 2021-01-04 ENCOUNTER — HOSPITAL ENCOUNTER (OUTPATIENT)
Age: 56
Discharge: HOME | End: 2021-01-04
Payer: COMMERCIAL

## 2021-01-04 DIAGNOSIS — E78.00 ELEVATED CHOLESTEROL: ICD-10-CM

## 2021-01-04 LAB
CHOLESTEROL, FASTING: 259 MG/DL
CHOLESTEROL/HDL RATIO: 3.8
HDLC SERPL-MCNC: 69 MG/DL
LDL CHOLESTEROL: 171 MG/DL (ref 0–130)
TRIGLYCERIDE, FASTING: 97 MG/DL
VLDLC SERPL CALC-MCNC: ABNORMAL MG/DL (ref 1–30)

## 2021-01-04 PROCEDURE — 80061 LIPID PANEL: CPT

## 2021-01-04 PROCEDURE — 36415 COLL VENOUS BLD VENIPUNCTURE: CPT

## 2021-01-20 ENCOUNTER — OFFICE VISIT (OUTPATIENT)
Dept: OBGYN CLINIC | Age: 56
End: 2021-01-20
Payer: COMMERCIAL

## 2021-01-20 VITALS
BODY MASS INDEX: 30.16 KG/M2 | SYSTOLIC BLOOD PRESSURE: 108 MMHG | WEIGHT: 199 LBS | TEMPERATURE: 98.1 F | HEIGHT: 68 IN | DIASTOLIC BLOOD PRESSURE: 70 MMHG

## 2021-01-20 DIAGNOSIS — Z01.419 VISIT FOR GYNECOLOGIC EXAMINATION: Primary | ICD-10-CM

## 2021-01-20 DIAGNOSIS — Z12.31 ENCOUNTER FOR SCREENING MAMMOGRAM FOR MALIGNANT NEOPLASM OF BREAST: ICD-10-CM

## 2021-01-20 PROCEDURE — G8484 FLU IMMUNIZE NO ADMIN: HCPCS | Performed by: NURSE PRACTITIONER

## 2021-01-20 PROCEDURE — 99396 PREV VISIT EST AGE 40-64: CPT | Performed by: NURSE PRACTITIONER

## 2021-01-20 ASSESSMENT — PATIENT HEALTH QUESTIONNAIRE - PHQ9
SUM OF ALL RESPONSES TO PHQ QUESTIONS 1-9: 0
SUM OF ALL RESPONSES TO PHQ9 QUESTIONS 1 & 2: 0

## 2021-01-20 NOTE — PROGRESS NOTES
History and Physical  [address], [address] [facility] [address] [place] [phone]   Fax [phone]  Lee Lund  2021              63 y.o. Chief Complaint   Patient presents with    Gynecologic Exam       No LMP recorded. Patient has had a hysterectomy. Primary Care Physician: Patricia Davis MD    The patient was seen and examined. She has no chief complaint today and is here for her annual exam.  Her bowels are regular. There are no voiding complaints. She denies any bloating. She denies vaginal discharge and was counseled on STD's and the need for barrier contraception.      HPI : Lee Lund is a 63 y.o. female [id number]    Annual exam  Hx of hysterectomy in  for heavy menses  No chief complaints  ________________________________________________________________________  OB History    Para Term  AB Living   3 1 1 0 2 1   SAB TAB Ectopic Molar Multiple Live Births   2 0 0 0 0 1      # Outcome Date GA Lbr Acosta/2nd Weight Sex Delivery Anes PTL Lv   3 SAB            2 SAB            1 Term    8 lb 7 oz (3.827 kg) F Vag-Spont   ZOEY     Past Medical History:   Diagnosis Date    Atrial fibrillation ([facility] [address]     Syncope     Vision abnormalities     glasses                                                                   Past Surgical History:   Procedure Laterality Date    APPENDECTOMY  1974    CARDIOVERSION  2017    LILLY    CARDIOVERSION N/A 3/30/2017    CARDIOVERSION performed by Abiel Malone MD at [address] [facility] [address]  2017    With Dr. Michele Alberta COLONOSCOPY  2/21/15    BIOPSY    HYSTERECTOMY  2010    PILONIDAL CYST EXCISION      TONSILLECTOMY AND ADENOIDECTOMY  1968    TRANSESOPHAGEAL ECHOCARDIOGRAM N/A 3/30/2017    TRANSESOPHAGEAL ECHOCARDIOGRAM performed by Abiel Malone MD at [address] [facility] History   Problem Relation Age of Onset    Diabetes Sister     Breast Cancer Neg Hx  Cancer Neg Hx     Colon Cancer Neg Hx     Eclampsia Neg Hx     Hypertension Neg Hx     Ovarian Cancer Neg Hx      Labor Neg Hx     Spont Abortions Neg Hx     Stroke Neg Hx      Social History     Socioeconomic History    Marital status:      Spouse name: Not on file    Number of children: Not on file    Years of education: Not on file    Highest education level: Not on file   Occupational History    Not on file   Social Needs    Financial resource strain: Not on file    Food insecurity     Worry: Not on file     Inability: Not on file    Transportation needs     Medical: Not on file     Non-medical: Not on file   Tobacco Use    Smoking status: Never Smoker    Smokeless tobacco: Never Used   Substance and Sexual Activity    Alcohol use:  Yes     Alcohol/week: 0.0 standard drinks     Comment: OCC    Drug use: No    Sexual activity: Yes     Partners: Male     Birth control/protection: Surgical     Comment: hyst   Lifestyle    Physical activity     Days per week: Not on file     Minutes per session: Not on file    Stress: Not on file   Relationships    Social connections     Talks on phone: Not on file     Gets together: Not on file     Attends Advent service: Not on file     Active member of club or organization: Not on file     Attends meetings of clubs or organizations: Not on file     Relationship status: Not on file    Intimate partner violence     Fear of current or ex partner: Not on file     Emotionally abused: Not on file     Physically abused: Not on file     Forced sexual activity: Not on file   Other Topics Concern    Not on file   Social History Narrative    Not on file       MEDICATIONS:  Current Outpatient Medications   Medication Sig Dispense Refill    CPAP Machine MISC use as directed      ibuprofen (ADVIL;MOTRIN) 800 MG tablet Take 1 tablet by mouth 3 times daily as needed      atorvastatin (LIPITOR) 40 MG tablet Take 1 tablet by mouth daily 30 tablet 2  Azelaic Acid 15 % GEL APPLY TO FACE ONCE A DAY as directed by prescriber  0    Cholecalciferol (VITAMIN D3) 5000 UNITS TABS Take 1 each by mouth daily       No current facility-administered medications for this visit. ALLERGIES:  Allergies as of 01/20/2021    (No Known Allergies)       Symptoms of decreased mood absent  Symptoms of anhedonia absent    **If either question is answered in a  positive fashion then complete the PHQ9 Scoring Evaluation and make the appropriate referral**      Immunization status: stated as current, but no records available. Gynecologic History:  Menarche: 14 yo  Menopause at 89 yo     No LMP recorded. Patient has had a hysterectomy. Sexually Active: Yes    STD History: No     Permanent Sterilization: Yes hyst   Reversible Birth Control: No        Hormone Replacement Exposure: No      Genetic Qualified Family History of Breast, Ovarian , Colon or Uterine Cancer: No     If YES see scanned worksheet. Preventative Health Testing:    Health Maintenance:  Health Maintenance Due   Topic Date Due    Shingles Vaccine (1 of 2) 09/18/2014    Cervical cancer screen  12/21/2016       Date of Last Pap Smear: 12/4/2014 negative  Abnormal Pap Smear History: yes  Colposcopy History:   Date of Last Mammogram: 2/24/2020 negative  Date of Last Colonoscopy: 2015 benign polyp- with DR Brii Sultana- to follow up with physician  Date of Last Bone Density:      ________________________________________________________________________        REVIEW OF SYSTEMS:    yes   A minimum of an eleven point review of systems was completed. Review Of Systems (11 point):  Constitutional: No fever, chills or malaise;  No weight change or fatigue  Head and Eyes: No vision, Headache, Dizziness or trauma in last 12 months  ENT ROS: No hearing, Tinnitis, sinus or taste problems  Hematological and Lymphatic ROS:No Lymphoma, Von Willebrand's, Hemophillia or Bleeding History Psych ROS: No Depression, Homicidal thoughts,suicidal thoughts, or anxiety  Breast ROS: No prior breast abnormalities or lumps  Respiratory ROS: No SOB, Pneumoniae,Cough, or Pulmonary Embolism History  Cardiovascular ROS: No Chest Pain with Exertion, Palpitations, Syncope, Edema, Arrhythmia. + hyperlipidemia  Gastrointestinal ROS: No Indigestion, Heartburn, Nausea, vomiting, Diarrhea, Constipation,or Bowel Changes; No Bloody Stools or melena  Genito-Urinary ROS: No Dysuria, Hematuria or Nocturia. No Urinary Incontinence or Vaginal Discharge  Musculoskeletal ROS: No Arthralgia, Arthritis,Gout,Osteoporosis or Rheumatism  Neurological ROS: No CVA, Migraines, Epilepsy, Seizure Hx, or Limb Weakness  Dermatological ROS: No Rash, Itching, Hives, Mole Changes or Cancer                                                                                                                                                                                                                                  PHYSICAL Exam:     Constitutional:  Vitals:    01/20/21 1636   BP: 108/70   Site: Right Upper Arm   Position: Sitting   Cuff Size: Medium Adult   Temp: 98.1 °F (36.7 °C)   Weight: 199 lb (90.3 kg)   Height: 5' 8\" (1.727 m)       Chaperone for Intimate Exam  ? Chaperone was offered and accepted as part of the rooming process. ? [facility]          General Appearance: This  is a well Developed, well Nourished, well groomed female. Her BMI was reviewed. Nutritional decision making was discussed. Skin:  There was a Normal Inspection of the skin without rashes or lesions. There were no rashes. (Papular, Maculopapular, Hives, Pustular, Macular)     There were no lesions (Ulcers, Erythema, Abn. Appearing Nevi)            Lymphatic:  No Lymph Nodes were Palpable in the neck , axilla or groin.  0 # Of Lymph Nodes;  Location ; Character [Normal]  [Shotty] [Tender] [Enlarged]     Neck and EENT: The neck was supple. There were no masses   The thyroid was not enlarged and had no masses. Perrla, EOMI B/L, TMI B/L No Abnormalities. Throat inspected-No exudates or Masses, Nares Patent No Masses        Respiratory: The lungs were auscultated and found to be clear. There were no rales, rhonchi or wheezes. There was a good respiratory effort. Cardiovascular: The heart was in a regular rate and rhythm. . No S3 or S4. There was no murmur appreciated. Location, grade, and radiation are not applicable. Extremities: The patients extremities were without calf tenderness, edema, or varicosities. There was full range of motion in all four extremities. Pulses in all four extremities were appreciated and are 2/4. Abdomen: The abdomen was soft and non-tender. There were good bowel sounds in all quadrants and there was no guarding, rebound or rigidity. On evaluation there was no evidence of hepatosplenomegaly and there was no costal vertebral lesa tenderness bilaterally. No hernias were appreciated. Abdominal Scars: hyst scar    Psych: The patient had a normal Orientation to: Time, Place, Person, and Situation  There is no Mood / Affect changes    Breast:  (Chest)  normal appearance, no masses or tenderness  Self breast exams were reviewed in detail. Literature was given. Pelvic Exam:  Vulva and vagina appear normal. Bimanual exam reveals normal cuff intact    Rectal Exam:  exam declined by patient          Musculosk:  Normal Gait and station was noted. Digits were evaluated without abnormal findings. Range of motion, stability and strength were evaluated and found to be appropriate for the patients age. ASSESSMENT:      63 y.o. Annual   Diagnosis Orders   1. Visit for gynecologic examination     2.  Encounter for screening mammogram for malignant neoplasm of breast  JACINTA DIGITAL SCREEN W OR WO CAD BILATERAL          Chief Complaint   Patient presents with   Arzate Gynecologic Exam Past Medical History:   Diagnosis Date    Atrial fibrillation ([address]     Syncope     Vision abnormalities     glasses         Patient Active Problem List    Diagnosis Date Noted    Apnea 07/16/2018    Status post placement of implantable loop recorder 12/20/2017     Hysterectomy for heavy menses- denies history of abnormal pap smears    Overview:   Overview:   Hysterectomy for heavy menses- denies history of abnormal pap smears      Abnormal vision 09/11/2017     glasses  Overview:   Overview:   glasses      Nonsustained ventricular tachycardia ([address] 04/26/2017    Paroxysmal atrial fibrillation ([facility] [address] 04/26/2017    Left ventricular dysfunction 04/06/2017    Chronic systolic dysfunction of left ventricle 04/06/2017    Atrial flutter ([facility] [address] 03/26/2017    Palpitations 03/30/2016    Hypermetropia 09/20/2012    Presbyopia 09/20/2012    Regular astigmatism 09/20/2012    Tear film insufficiency 09/20/2012          Hereditary Breast, Ovarian, Colon and Uterine Cancer screening Done. Tobacco & Secondary smoke risks reviewed; instructed on cessation and avoidance      Counseling Completed:  Preventative Health Recommendations and Follow up. The patient was informed of the recommended preventative health recommendations. 1. Annuals every year; Cytology collections per prevailing guidelines. 2. Mammograms begin every year at 35 yo if no abnormalities are found and no family history. 3. Bone density studies every 2-3 years. Begin at 73 yo. If no fracture history or osteoporosis family history. (significant). 4. Colonoscopy begin at 38 yo. Repeat every ten years if negative and no family history. 5. Calcium of 2031-7261 mg/day in split dosing  6. Vitamin D 400-800 IU/day  7. All other preventative health recommendations will be managed by the patients Primary care physician. PLAN:  Return in about 1 year (around 1/20/2022) for annual.   Mammogram ordered.   Repeat Annual every 1 year Cervical Cytology Evaluation begins at 24years old. If Negative Cytology, Follow-up screening per current guidelines. Mammograms every 1 year. If 35 yo and last mammogram was negative. Calcium and Vitamin D dosing reviewed. Colonoscopy screening reviewed as well as onset for bone density testing. Birth control and barrier recommendations discussed. STD counseling and prevention reviewed. Gardisil counseling completed for all patients 10-35 yo. Routine health maintenance per patients PCP. Orders Placed This Encounter   Procedures    JACINTA DIGITAL SCREEN W OR WO CAD BILATERAL     Standing Status:   Future     Standing Expiration Date:   1/19/2022     Order Specific Question:   Reason for exam:     Answer:   SCREENING-PREVENTATIVE           The patient, Uma Lane is a 63 y.o. female, was seen with a total time spent of 20 minutes for the visit on this date of service by the E/M provider. The time component had both face to face and non face to face time spent in determining the total time component. Counseling and education regarding her diagnosis listed below and her options regarding those diagnoses were also included in determining her time component. Diagnosis Orders   1. Visit for gynecologic examination     2. Encounter for screening mammogram for malignant neoplasm of breast  JACINTA DIGITAL SCREEN W OR WO CAD BILATERAL        The patient had her preventative health maintenance recommendations and follow-up reviewed with her at the completion of her visit.

## 2021-03-04 ENCOUNTER — HOSPITAL ENCOUNTER (OUTPATIENT)
Dept: WOMENS IMAGING | Age: 56
Discharge: HOME | End: 2021-03-06
Payer: COMMERCIAL

## 2021-03-04 DIAGNOSIS — Z12.31 ENCOUNTER FOR SCREENING MAMMOGRAM FOR MALIGNANT NEOPLASM OF BREAST: ICD-10-CM

## 2021-03-04 PROCEDURE — 77063 BREAST TOMOSYNTHESIS BI: CPT

## 2021-04-02 ENCOUNTER — HOSPITAL ENCOUNTER (OUTPATIENT)
Age: 56
Discharge: HOME | End: 2021-04-02
Payer: COMMERCIAL

## 2021-04-02 DIAGNOSIS — E78.00 HYPERCHOLESTEREMIA: ICD-10-CM

## 2021-04-02 LAB
ALT SERPL-CCNC: 30 U/L (ref 5–33)
AST SERPL-CCNC: 23 U/L
CHOLESTEROL/HDL RATIO: 2.4
CHOLESTEROL: 120 MG/DL
HDLC SERPL-MCNC: 50 MG/DL
LDL CHOLESTEROL: 61 MG/DL (ref 0–130)
TRIGL SERPL-MCNC: 43 MG/DL
VLDLC SERPL CALC-MCNC: NORMAL MG/DL (ref 1–30)

## 2021-04-02 PROCEDURE — 36415 COLL VENOUS BLD VENIPUNCTURE: CPT

## 2021-04-02 PROCEDURE — 84460 ALANINE AMINO (ALT) (SGPT): CPT

## 2021-04-02 PROCEDURE — 80061 LIPID PANEL: CPT

## 2021-04-02 PROCEDURE — 84450 TRANSFERASE (AST) (SGOT): CPT

## 2021-10-04 PROBLEM — G47.33 OSA ON CPAP: Status: ACTIVE | Noted: 2021-06-08

## 2021-10-04 PROBLEM — Z99.89 OSA ON CPAP: Status: ACTIVE | Noted: 2021-06-08

## 2021-12-20 ENCOUNTER — HOSPITAL ENCOUNTER (OUTPATIENT)
Age: 56
Discharge: HOME | End: 2021-12-20
Payer: COMMERCIAL

## 2021-12-20 DIAGNOSIS — E78.00 HYPERCHOLESTEREMIA: ICD-10-CM

## 2021-12-20 LAB
CHOLESTEROL/HDL RATIO: 2.9
CHOLESTEROL: 181 MG/DL
HDLC SERPL-MCNC: 63 MG/DL
LDL CHOLESTEROL: 105 MG/DL (ref 0–130)
TRIGL SERPL-MCNC: 67 MG/DL
VLDLC SERPL CALC-MCNC: NORMAL MG/DL (ref 1–30)

## 2021-12-20 PROCEDURE — 36415 COLL VENOUS BLD VENIPUNCTURE: CPT

## 2021-12-20 PROCEDURE — 80061 LIPID PANEL: CPT

## 2022-01-24 ENCOUNTER — OFFICE VISIT (OUTPATIENT)
Dept: OBGYN CLINIC | Age: 57
End: 2022-01-24
Payer: COMMERCIAL

## 2022-01-24 VITALS
HEIGHT: 68 IN | DIASTOLIC BLOOD PRESSURE: 80 MMHG | SYSTOLIC BLOOD PRESSURE: 126 MMHG | WEIGHT: 202 LBS | BODY MASS INDEX: 30.62 KG/M2

## 2022-01-24 DIAGNOSIS — Z12.31 ENCOUNTER FOR SCREENING MAMMOGRAM FOR MALIGNANT NEOPLASM OF BREAST: ICD-10-CM

## 2022-01-24 DIAGNOSIS — Z01.419 VISIT FOR GYNECOLOGIC EXAMINATION: Primary | ICD-10-CM

## 2022-01-24 DIAGNOSIS — Z12.11 SCREEN FOR COLON CANCER: ICD-10-CM

## 2022-01-24 PROCEDURE — 99396 PREV VISIT EST AGE 40-64: CPT | Performed by: NURSE PRACTITIONER

## 2022-01-24 PROCEDURE — G8482 FLU IMMUNIZE ORDER/ADMIN: HCPCS | Performed by: NURSE PRACTITIONER

## 2022-01-24 ASSESSMENT — PATIENT HEALTH QUESTIONNAIRE - PHQ9
1. LITTLE INTEREST OR PLEASURE IN DOING THINGS: 0
SUM OF ALL RESPONSES TO PHQ9 QUESTIONS 1 & 2: 0
2. FEELING DOWN, DEPRESSED OR HOPELESS: 0
SUM OF ALL RESPONSES TO PHQ QUESTIONS 1-9: 0

## 2022-01-24 NOTE — PROGRESS NOTES
History and Physical  [address], [address] [facility] [address] [place] [address]. [phone]   Fax [phone]  Claressa Goldberg  2022              61 y.o. Chief Complaint   Patient presents with    Gynecologic Exam       No LMP recorded. Patient has had a hysterectomy. Primary Care Physician: Jovani Jacobo MD    The patient was seen and examined. She has no chief complaint today and is here for her annual exam.  Her bowels are regular. There are no voiding complaints. She denies any bloating. She denies vaginal discharge and was counseled on STD's and the need for barrier contraception. HPI : Claressa Goldberg is a 61 y.o. female [id number]    Annual exam  Hx of hysterectomy in  for heavy menses. Denies hx of cervical dysplasia.   No chief complaint  no Bloating  no Early Satiety  no Unexplained weight change of more than 15 lbs  no  PMB  no  PCB  ________________________________________________________________________  OB History    Para Term  AB Living   3 1 1 0 2 1   SAB IAB Ectopic Molar Multiple Live Births   2 0 0 0 0 1      # Outcome Date GA Lbr Acosta/2nd Weight Sex Delivery Anes PTL Lv   3 SAB            2 SAB            1 Term    8 lb 7 oz (3.827 kg) F Vag-Spont   ZOEY     Past Medical History:   Diagnosis Date    Atrial fibrillation ([address]     BRAULIO on CPAP 2021    Syncope     Vision abnormalities     glasses                                                                   Past Surgical History:   Procedure Laterality Date    APPENDECTOMY  1974    CARDIOVERSION  2017    LILLY    CARDIOVERSION N/A 3/30/2017    CARDIOVERSION performed by Jaspreet Martínez MD at [facility] [address]  2017    With Dr. Felicia Londono COLONOSCOPY  2/21/15    BIOPSY    HYSTERECTOMY  2010    PILONIDAL CYST EXCISION      TONSILLECTOMY AND ADENOIDECTOMY      TRANSESOPHAGEAL ECHOCARDIOGRAM N/A 3/30/2017    TRANSESOPHAGEAL ECHOCARDIOGRAM performed by Sharon Hussein MD at [address] [place] [address] History   Problem Relation Age of Onset    Diabetes Sister     Breast Cancer Neg Hx     Cancer Neg Hx     Colon Cancer Neg Hx     Eclampsia Neg Hx     Hypertension Neg Hx     Ovarian Cancer Neg Hx      Labor Neg Hx     Spont Abortions Neg Hx     Stroke Neg Hx      Social History     Socioeconomic History    Marital status:      Spouse name: Not on file    Number of children: Not on file    Years of education: Not on file    Highest education level: Not on file   Occupational History    Not on file   Tobacco Use    Smoking status: Never Smoker    Smokeless tobacco: Never Used   Substance and Sexual Activity    Alcohol use: Yes     Alcohol/week: 0.0 standard drinks     Comment: OCC    Drug use: No    Sexual activity: Yes     Partners: Male     Birth control/protection: Surgical     Comment: hyst   Other Topics Concern    Not on file   Social History Narrative    Not on file     Social Determinants of Health     Financial Resource Strain: Low Risk     Difficulty of Paying Living Expenses: Not hard at all   Food Insecurity: No Food Insecurity    Worried About Running Out of Food in the Last Year: Never true    [address] in the Last Year: Never true   Transportation Needs:     Lack of Transportation (Medical): Not on file    Lack of Transportation (Non-Medical):  Not on file   Physical Activity:     Days of Exercise per Week: Not on file    Minutes of Exercise per Session: Not on file   Stress:     Feeling of Stress : Not on file   Social Connections:     Frequency of Communication with Friends and Family: Not on file    Frequency of Social Gatherings with Friends and Family: Not on file    Attends Jain Services: Not on file    Active Member of Clubs or Organizations: Not on file    Attends Club or Organization Meetings: Not on file    Marital Status: Not on file   Intimate Partner Violence:  Fear of Current or Ex-Partner: Not on file    Emotionally Abused: Not on file    Physically Abused: Not on file    Sexually Abused: Not on file   Housing Stability:     Unable to Pay for Housing in the Last Year: Not on file    Number of Places Lived in the Last Year: Not on file    Unstable Housing in the Last Year: Not on file       MEDICATIONS:  Current Outpatient Medications   Medication Sig Dispense Refill    triamcinolone (NASACORT ALLERGY 24HR) 55 MCG/ACT nasal inhaler 2 sprays by Each Nostril route daily      ezetimibe (ZETIA) 10 MG tablet Take 1 tablet by mouth daily 30 tablet 3    CPAP Machine MISC use as directed      ibuprofen (ADVIL;MOTRIN) 800 MG tablet Take 1 tablet by mouth 3 times daily as needed      Cholecalciferol (VITAMIN D3) 5000 UNITS TABS Take 1 each by mouth daily      atorvastatin (LIPITOR) 40 MG tablet take 1 tablet by mouth once daily (Patient not taking: Reported on 7/15/2021) 30 tablet 2    Azelaic Acid 15 % GEL APPLY TO FACE ONCE A DAY as directed by prescriber (Patient not taking: Reported on 7/15/2021)  0     No current facility-administered medications for this visit. ALLERGIES:  Allergies as of 01/24/2022    (No Known Allergies)       Symptoms of decreased mood absent  Symptoms of anhedonia absent    **If either question is answered in a  positive fashion then complete the PHQ9 Scoring Evaluation and make the appropriate referral**      Immunization status: stated as current, but no records available. Gynecologic History:  Menarche: 14 yo  Menopause at 89 yo     No LMP recorded. Patient has had a hysterectomy. Sexually Active: Yes    STD History: No     Permanent Sterilization: Yes hyst   Reversible Birth Control: No        Hormone Replacement Exposure: No      Genetic Qualified Family History of Breast, Ovarian , Colon or Uterine Cancer: No (paternal aunt with breast cancer- 46s)     If YES see scanned worksheet.     Preventative Health Testing:    Health Maintenance:  Health Maintenance Due   Topic Date Due    Depression Screen  Never done    Shingles Vaccine (1 of 2) Never done       Date of Last Pap Smear: 2014 negative  Abnormal Pap Smear History: denies  Colposcopy History:   Date of Last Mammogram: 3/4/2021 negative  Date of Last Colonoscopy: - benign polyp, followed by Dr Lia Apgar. Date of Last Bone Density:      ________________________________________________________________________        REVIEW OF SYSTEMS:    yes   A minimum of an eleven point review of systems was completed. Review Of Systems (11 point):  Constitutional: No fever, chills or malaise; No weight change or fatigue  Head and Eyes: No vision changes, Headache, Dizziness or trauma in last 12 months  ENT ROS: No hearing, Tinnitis, sinus or taste problems  Hematological and Lymphatic ROS:No Lymphoma, Von Willebrand's, Hemophillia or Bleeding History  Psych ROS: No Depression, Homicidal thoughts,suicidal thoughts, or anxiety  Breast ROS: No breast abnormalities or lumps  Respiratory ROS: No SOB, Pneumoniae,Cough, or Pulmonary Embolism   Cardiovascular ROS: No Chest Pain with Exertion, Palpitations, Syncope, Edema, Arrhythmia. + hyperlipidemia  Gastrointestinal ROS: No Indigestion, Heartburn, Nausea, vomiting, Diarrhea, Constipation,or Bowel Changes; No Bloody Stools or melena  Genito-Urinary ROS: No Dysuria, Hematuria or Nocturia.  No Urinary Incontinence or Vaginal Discharge  Musculoskeletal ROS: No Arthralgia, Arthritis,Gout,Osteoporosis or Rheumatism  Neurological ROS: No CVA, Migraines, Epilepsy, Seizure Hx, or Limb Weakness  Dermatological ROS: No Rash, Itching, Hives, Mole Changes or Cancer                                                                                                                                                                                                                                  PHYSICAL Exam:     Constitutional:  Vitals: 01/24/22 1611   BP: 126/80   Site: Right Upper Arm   Position: Sitting   Cuff Size: Medium Adult   Weight: 202 lb (91.6 kg)   Height: 5' 8\" (1.727 m)       Chaperone for Intimate Exam   Chaperone was offered and accepted as part of the rooming process.  Chaperone: Jc Mathurmessi          General Appearance: This  is a well Developed, well Nourished, well groomed female. Her BMI was reviewed. Nutritional decision making was discussed. Skin:  There was a Normal Inspection of the skin without rashes or lesions. There were no rashes. (Papular, Maculopapular, Hives, Pustular, Macular)     There were no lesions (Ulcers, Erythema, Abn. Appearing Nevi)            Lymphatic:  No Lymph Nodes were Palpable in the neck , axilla or groin.  0 # Of Lymph Nodes; Location ; Character [Normal]  [Shotty] [Tender] [Enlarged]     Neck and EENT:  The neck was supple. There were no masses   The thyroid was not enlarged and had no masses. Perrla, EOMI B/L, TMI B/L No Abnormalities. Throat inspected-No exudates or Masses, Nares Patent No Masses        Respiratory: The lungs were auscultated and found to be clear. There were no rales, rhonchi or wheezes. There was a good respiratory effort. Cardiovascular: The heart was in a regular rate and rhythm. . No S3 or S4. There was no murmur appreciated. Location, grade, and radiation are not applicable. Extremities: The patients extremities were without calf tenderness, edema, or varicosities. There was full range of motion in all four extremities. Pulses in all four extremities were appreciated and are 2/4. Abdomen: The abdomen was soft and non-tender. There were good bowel sounds in all quadrants and there was no guarding, rebound or rigidity. On evaluation there was no evidence of hepatosplenomegaly and there was no costal vertebral lesa tenderness bilaterally. No hernias were appreciated. Abdominal Scars: intact    Psych:   The patient had a normal Orientation to: Time, Place, Person, and Situation  There is no Mood / Affect changes    Breast:  (Chest)  normal appearance, no masses or tenderness  Self breast exams were reviewed in detail. Literature was given. Pelvic Exam:  External genitalia: normal general appearance  Urinary system: urethral meatus normal  Vaginal: normal mucosa without prolapse or lesions  Cervix: absent  Uterus: absent    Rectal Exam:  exam declined by patient          Musculosk:  Normal Gait and station was noted. Digits were evaluated without abnormal findings. Range of motion, stability and strength were evaluated and found to be appropriate for the patients age. ASSESSMENT:      61 y.o. Annual   Diagnosis Orders   1. Visit for gynecologic examination     2.  Encounter for screening mammogram for malignant neoplasm of breast  JACINTA DIGITAL SCREEN W OR WO CAD BILATERAL   3. Screen for colon cancer  [facility] Screening Colonoscopy          Chief Complaint   Patient presents with    Gynecologic Exam          Past Medical History:   Diagnosis Date    Atrial fibrillation ([address]     BRAULIO on CPAP 6/8/2021    Syncope     Vision abnormalities     glasses         Patient Active Problem List    Diagnosis Date Noted    BRAULIO on CPAP 06/08/2021    Apnea 07/16/2018    Presence of other cardiac implants and grafts 12/20/2017     Hysterectomy for heavy menses- denies history of abnormal pap smears    Overview:   Overview:   Hysterectomy for heavy menses- denies history of abnormal pap smears      Abnormal vision 09/11/2017     glasses  Overview:   Overview:   glasses    Formatting of this note might be different from the original.  Overview:   glasses      Nonsustained ventricular tachycardia ([address] 04/26/2017    Paroxysmal atrial fibrillation ([address] 04/26/2017    Left ventricular dysfunction 04/06/2017    Chronic systolic dysfunction of left ventricle 04/06/2017    Atrial flutter ([address] 03/26/2017    Palpitations 03/30/2016    Hypermetropia 09/20/2012  Presbyopia 09/20/2012    Regular astigmatism 09/20/2012    Tear film insufficiency 09/20/2012          Hereditary Breast, Ovarian, Colon and Uterine Cancer screening Done. Tobacco & Secondary smoke risks reviewed; instructed on cessation and avoidance      Counseling Completed:  Preventative Health Recommendations and Follow up. The patient was informed of the recommended preventative health recommendations. 1. Annuals every year; Cytology collections per prevailing guidelines. 2. Mammograms begin every year at 37 yo if no abnormalities are found and no family history. 3. Bone density studies every 2-3 years. Begin at 71 yo. If no fracture history or osteoporosis family history. (significant). 4. Colonoscopy begin at 40 yo. Repeat every ten years if negative and no family history. 5. Calcium of 2340-9653 mg/day in split dosing  6. Vitamin D 400-800 IU/day  7. All other preventative health recommendations will be managed by the patients Primary care physician. PLAN:  Return in about 1 year (around 1/24/2023) for annual.   Mammogram ordered. Referral to DR Alexy Freeman for screening colonoscopy. Repeat Annual every 1 year  Cervical Cytology Evaluation begins at 24years old. If Negative Cytology, Follow-up screening per current guidelines. Mammograms every 1 year. If 37 yo and last mammogram was negative. Calcium and Vitamin D dosing reviewed. Colonoscopy screening reviewed as well as onset for bone density testing. Birth control and barrier recommendations discussed. STD counseling and prevention reviewed. Gardisil counseling completed for all patients 10-37 yo. Routine health maintenance per patients PCP.   Orders Placed This Encounter   Procedures    JACINTA DIGITAL SCREEN W OR WO CAD BILATERAL     Standing Status:   Future     Standing Expiration Date:   1/20/2023     Order Specific Question:   Reason for exam:     Answer:   [address] Screening Colonoscopy     Referral

## 2022-01-25 DIAGNOSIS — Z12.11 SCREENING FOR COLON CANCER: Primary | ICD-10-CM

## 2022-03-07 ENCOUNTER — HOSPITAL ENCOUNTER (OUTPATIENT)
Dept: WOMENS IMAGING | Age: 57
Discharge: HOME | End: 2022-03-09
Payer: COMMERCIAL

## 2022-03-07 DIAGNOSIS — Z12.31 ENCOUNTER FOR SCREENING MAMMOGRAM FOR MALIGNANT NEOPLASM OF BREAST: ICD-10-CM

## 2022-03-07 PROCEDURE — 77063 BREAST TOMOSYNTHESIS BI: CPT

## 2022-05-23 ENCOUNTER — HOSPITAL ENCOUNTER (OUTPATIENT)
Dept: PREADMISSION TESTING | Age: 57
Discharge: HOME | End: 2022-05-27

## 2022-05-23 VITALS — BODY MASS INDEX: 29.55 KG/M2 | HEIGHT: 68 IN | WEIGHT: 195 LBS

## 2022-05-23 DIAGNOSIS — G47.33 OSA ON CPAP: ICD-10-CM

## 2022-05-23 DIAGNOSIS — Z99.89 OSA ON CPAP: ICD-10-CM

## 2022-05-23 DIAGNOSIS — I48.92 ATRIAL FLUTTER, UNSPECIFIED TYPE (HCC): Primary | ICD-10-CM

## 2022-05-23 NOTE — PROGRESS NOTES
Pre-op Instructions For Out-Patient Endoscopy Surgery    Medication Instructions:  · Please stop herbs and any supplements now (includes vitamins and minerals). · Please contact your surgeon and prescribing physician for pre-op instructions for any blood thinners. · If you have inhalers/aerosol treatments at home, please use them the morning of your surgery and bring the inhalers with you to the hospital.    · Please take the following medications the morning of your surgery with a sip of water:  None. Surgery Instructions:  1. After midnight before surgery:  Do not eat or drink anything, including water, mints, gum, and hard candy. You may brush your teeth without swallowing. No smoking, chewing tobacco, or street drugs. 2. Please shower or bathe before surgery. 3. Please do not wear any cologne, lotion, powder, jewelry, piercings, perfume, makeup, nail polish, hair accessories, or hair spray on the day of surgery. Wear loose comfortable clothing. 4. Leave your valuables at home. Bring a storage case for any glasses/contacts. 5. An adult who is responsible for you MUST drive you home and should be with you for the first 24 hours after surgery. The Day of Surgery:  · Arrive at [facility] AT [facility] Surgery Entrance at the time directed by your surgeon and check in at the desk. · If you have a living will or healthcare power of , please bring a copy. · You will be taken to the pre-op holding area where you will be prepared for surgery. A physical assessment will be performed by a nurse practitioner or house officer. Your IV will be started and you will meet your anesthesiologist.    · When you go to surgery, your family will be directed to the surgical waiting room, where the doctor should speak with them after your surgery.     · After surgery, you will be taken to the recovery room then when you are awake and stable you will go to the short stay unit for preparation to be discharged. Instructions read to Amina Raygoza and understanding verbalized.

## 2022-05-23 NOTE — PROGRESS NOTES
Dr. Jose Angel Nicholas, anesthesia, was contacted and informed of patient's history and planned surgery. Orders received and placed in computer, Rayne re- notified of orders for CBC, BMP & EKG and will come to [facility] before procedure to have done, no clearance required.

## 2022-05-26 ENCOUNTER — HOSPITAL ENCOUNTER (OUTPATIENT)
Age: 57
Discharge: HOME | End: 2022-05-26
Payer: COMMERCIAL

## 2022-05-26 DIAGNOSIS — Z99.89 OSA ON CPAP: ICD-10-CM

## 2022-05-26 DIAGNOSIS — G47.33 OSA ON CPAP: ICD-10-CM

## 2022-05-26 DIAGNOSIS — I48.92 ATRIAL FLUTTER, UNSPECIFIED TYPE (HCC): ICD-10-CM

## 2022-05-26 LAB
ABSOLUTE EOS #: 0.2 K/UL (ref 0–0.4)
ABSOLUTE LYMPH #: 1.5 K/UL (ref 1–4.8)
ABSOLUTE MONO #: 0.4 K/UL (ref 0.1–1.3)
ANION GAP SERPL CALCULATED.3IONS-SCNC: 10 MMOL/L (ref 9–17)
BASOPHILS # BLD: 1 % (ref 0–2)
BASOPHILS ABSOLUTE: 0 K/UL (ref 0–0.2)
BUN BLDV-MCNC: 12 MG/DL (ref 6–20)
CALCIUM SERPL-MCNC: 9.4 MG/DL (ref 8.6–10.4)
CHLORIDE BLD-SCNC: 105 MMOL/L (ref 98–107)
CO2: 27 MMOL/L (ref 20–31)
CREAT SERPL-MCNC: 0.66 MG/DL (ref 0.5–0.9)
EKG ATRIAL RATE: 65 BPM
EKG P AXIS: 80 DEGREES
EKG P-R INTERVAL: 138 MS
EKG Q-T INTERVAL: 452 MS
EKG QRS DURATION: 86 MS
EKG QTC CALCULATION (BAZETT): 470 MS
EKG R AXIS: 63 DEGREES
EKG T AXIS: 66 DEGREES
EKG VENTRICULAR RATE: 65 BPM
EOSINOPHILS RELATIVE PERCENT: 4 % (ref 0–4)
GFR AFRICAN AMERICAN: >60 ML/MIN
GFR NON-AFRICAN AMERICAN: >60 ML/MIN
GFR SERPL CREATININE-BSD FRML MDRD: ABNORMAL ML/MIN/{1.73_M2}
GLUCOSE BLD-MCNC: 110 MG/DL (ref 70–99)
HCT VFR BLD CALC: 40 % (ref 36–46)
HEMOGLOBIN: 13.6 G/DL (ref 12–16)
LYMPHOCYTES # BLD: 37 % (ref 24–44)
MCH RBC QN AUTO: 29.7 PG (ref 26–34)
MCHC RBC AUTO-ENTMCNC: 33.9 G/DL (ref 31–37)
MCV RBC AUTO: 87.4 FL (ref 80–100)
MONOCYTES # BLD: 9 % (ref 1–7)
PDW BLD-RTO: 13 % (ref 11.5–14.9)
PLATELET # BLD: 231 K/UL (ref 150–450)
PMV BLD AUTO: 8.2 FL (ref 6–12)
POTASSIUM SERPL-SCNC: 4.3 MMOL/L (ref 3.7–5.3)
RBC # BLD: 4.57 M/UL (ref 4–5.2)
SEG NEUTROPHILS: 49 % (ref 36–66)
SEGMENTED NEUTROPHILS ABSOLUTE COUNT: 2 K/UL (ref 1.3–9.1)
SODIUM BLD-SCNC: 142 MMOL/L (ref 135–144)
WBC # BLD: 4.1 K/UL (ref 3.5–11)

## 2022-05-26 PROCEDURE — 80048 BASIC METABOLIC PNL TOTAL CA: CPT

## 2022-05-26 PROCEDURE — 93010 ELECTROCARDIOGRAM REPORT: CPT | Performed by: INTERNAL MEDICINE

## 2022-05-26 PROCEDURE — 36415 COLL VENOUS BLD VENIPUNCTURE: CPT

## 2022-05-26 PROCEDURE — 85025 COMPLETE CBC W/AUTO DIFF WBC: CPT

## 2022-05-26 PROCEDURE — 93005 ELECTROCARDIOGRAM TRACING: CPT | Performed by: ANESTHESIOLOGY

## 2022-06-03 ENCOUNTER — ANESTHESIA EVENT (OUTPATIENT)
Dept: ENDOSCOPY | Age: 57
End: 2022-06-03
Payer: COMMERCIAL

## 2022-06-06 ENCOUNTER — ANESTHESIA (OUTPATIENT)
Dept: ENDOSCOPY | Age: 57
End: 2022-06-06
Payer: COMMERCIAL

## 2022-06-06 ENCOUNTER — HOSPITAL ENCOUNTER (OUTPATIENT)
Age: 57
Setting detail: OUTPATIENT SURGERY
Discharge: HOME | End: 2022-06-06
Attending: SURGERY | Admitting: SURGERY
Payer: COMMERCIAL

## 2022-06-06 VITALS
HEART RATE: 55 BPM | RESPIRATION RATE: 16 BRPM | SYSTOLIC BLOOD PRESSURE: 125 MMHG | HEIGHT: 68 IN | DIASTOLIC BLOOD PRESSURE: 69 MMHG | TEMPERATURE: 97 F | WEIGHT: 195 LBS | BODY MASS INDEX: 29.55 KG/M2

## 2022-06-06 PROCEDURE — 2500000003 HC RX 250 WO HCPCS: Performed by: NURSE ANESTHETIST, CERTIFIED REGISTERED

## 2022-06-06 PROCEDURE — 7100000001 HC PACU RECOVERY - ADDTL 15 MIN: Performed by: SURGERY

## 2022-06-06 PROCEDURE — 7100000031 HC ASPR PHASE II RECOVERY - ADDTL 15 MIN: Performed by: SURGERY

## 2022-06-06 PROCEDURE — 2580000003 HC RX 258: Performed by: ANESTHESIOLOGY

## 2022-06-06 PROCEDURE — 7100000000 HC PACU RECOVERY - FIRST 15 MIN: Performed by: SURGERY

## 2022-06-06 PROCEDURE — 7100000030 HC ASPR PHASE II RECOVERY - FIRST 15 MIN: Performed by: SURGERY

## 2022-06-06 PROCEDURE — 6360000002 HC RX W HCPCS: Performed by: NURSE ANESTHETIST, CERTIFIED REGISTERED

## 2022-06-06 PROCEDURE — 7100000011 HC PHASE II RECOVERY - ADDTL 15 MIN: Performed by: SURGERY

## 2022-06-06 PROCEDURE — 3700000000 HC ANESTHESIA ATTENDED CARE: Performed by: SURGERY

## 2022-06-06 PROCEDURE — 7100000010 HC PHASE II RECOVERY - FIRST 15 MIN: Performed by: SURGERY

## 2022-06-06 PROCEDURE — 2709999900 HC NON-CHARGEABLE SUPPLY: Performed by: SURGERY

## 2022-06-06 PROCEDURE — 3609027000 HC COLONOSCOPY: Performed by: SURGERY

## 2022-06-06 PROCEDURE — 3700000001 HC ADD 15 MINUTES (ANESTHESIA): Performed by: SURGERY

## 2022-06-06 RX ORDER — LIDOCAINE HYDROCHLORIDE 20 MG/ML
INJECTION, SOLUTION EPIDURAL; INFILTRATION; INTRACAUDAL; PERINEURAL PRN
Status: DISCONTINUED | OUTPATIENT
Start: 2022-06-06 | End: 2022-06-06 | Stop reason: SDUPTHER

## 2022-06-06 RX ORDER — DEXAMETHASONE SODIUM PHOSPHATE 4 MG/ML
INJECTION, SOLUTION INTRA-ARTICULAR; INTRALESIONAL; INTRAMUSCULAR; INTRAVENOUS; SOFT TISSUE PRN
Status: DISCONTINUED | OUTPATIENT
Start: 2022-06-06 | End: 2022-06-06 | Stop reason: SDUPTHER

## 2022-06-06 RX ORDER — LIDOCAINE HYDROCHLORIDE 10 MG/ML
1 INJECTION, SOLUTION EPIDURAL; INFILTRATION; INTRACAUDAL; PERINEURAL
Status: DISCONTINUED | OUTPATIENT
Start: 2022-06-06 | End: 2022-06-06 | Stop reason: HOSPADM

## 2022-06-06 RX ORDER — SODIUM CHLORIDE 9 MG/ML
INJECTION, SOLUTION INTRAVENOUS PRN
Status: DISCONTINUED | OUTPATIENT
Start: 2022-06-06 | End: 2022-06-06 | Stop reason: HOSPADM

## 2022-06-06 RX ORDER — SODIUM CHLORIDE 0.9 % (FLUSH) 0.9 %
5-40 SYRINGE (ML) INJECTION PRN
Status: DISCONTINUED | OUTPATIENT
Start: 2022-06-06 | End: 2022-06-06 | Stop reason: HOSPADM

## 2022-06-06 RX ORDER — PROPOFOL 10 MG/ML
INJECTION, EMULSION INTRAVENOUS PRN
Status: DISCONTINUED | OUTPATIENT
Start: 2022-06-06 | End: 2022-06-06 | Stop reason: SDUPTHER

## 2022-06-06 RX ORDER — SIMETHICONE 20 MG/.3ML
EMULSION ORAL PRN
Status: DISCONTINUED | OUTPATIENT
Start: 2022-06-06 | End: 2022-06-06 | Stop reason: ALTCHOICE

## 2022-06-06 RX ORDER — ONDANSETRON 2 MG/ML
INJECTION INTRAMUSCULAR; INTRAVENOUS PRN
Status: DISCONTINUED | OUTPATIENT
Start: 2022-06-06 | End: 2022-06-06 | Stop reason: SDUPTHER

## 2022-06-06 RX ORDER — SODIUM CHLORIDE, SODIUM LACTATE, POTASSIUM CHLORIDE, CALCIUM CHLORIDE 600; 310; 30; 20 MG/100ML; MG/100ML; MG/100ML; MG/100ML
INJECTION, SOLUTION INTRAVENOUS CONTINUOUS
Status: DISCONTINUED | OUTPATIENT
Start: 2022-06-06 | End: 2022-06-06 | Stop reason: HOSPADM

## 2022-06-06 RX ORDER — SODIUM CHLORIDE 0.9 % (FLUSH) 0.9 %
5-40 SYRINGE (ML) INJECTION EVERY 12 HOURS SCHEDULED
Status: DISCONTINUED | OUTPATIENT
Start: 2022-06-06 | End: 2022-06-06 | Stop reason: HOSPADM

## 2022-06-06 RX ADMIN — SODIUM CHLORIDE, POTASSIUM CHLORIDE, SODIUM LACTATE AND CALCIUM CHLORIDE: 600; 310; 30; 20 INJECTION, SOLUTION INTRAVENOUS at 06:40

## 2022-06-06 RX ADMIN — LIDOCAINE HYDROCHLORIDE 100 MG: 20 INJECTION, SOLUTION EPIDURAL; INFILTRATION; INTRACAUDAL; PERINEURAL at 07:47

## 2022-06-06 RX ADMIN — ONDANSETRON 4 MG: 2 INJECTION INTRAMUSCULAR; INTRAVENOUS at 08:10

## 2022-06-06 RX ADMIN — PROPOFOL 200 MG: 10 INJECTION, EMULSION INTRAVENOUS at 07:47

## 2022-06-06 RX ADMIN — DEXAMETHASONE SODIUM PHOSPHATE 4 MG: 4 INJECTION, SOLUTION INTRA-ARTICULAR; INTRALESIONAL; INTRAMUSCULAR; INTRAVENOUS; SOFT TISSUE at 08:10

## 2022-06-06 ASSESSMENT — ENCOUNTER SYMPTOMS
SHORTNESS OF BREATH: 0
SORE THROAT: 0
RHINORRHEA: 0
TROUBLE SWALLOWING: 0
WHEEZING: 0
COUGH: 0

## 2022-06-06 ASSESSMENT — PAIN - FUNCTIONAL ASSESSMENT: PAIN_FUNCTIONAL_ASSESSMENT: NONE - DENIES PAIN

## 2022-06-06 NOTE — H&P
HISTORY and [address]       NAME:  Suleiman Dukes  MRN: [id number]   YOB: 1965   Date: 6/6/2022   Age: 61 y.o. Gender: female     COMPLAINT AND PRESENT HISTORY:   Suleiman Dukes is 61 y.o.,  female, undergoing COLONOSCOPY DIAGNOSTIC for HISTORY OF POLYPS.    COLONOSCOPY QUESTIONNAIRE  LAST COLONOSCOPY: 2015. HISTORY OF COLON POLYPS: Yes. ABD PAIN: Denies. CONSTIPATION: Denies. DIARRHEA (ASIDE FROM COLONOSCOPY PREP): Denies. BLOOD IN STOOL/BLACK, TARRY STOOLS: Denies. NAUSEA/VOMITING/HEMATEMESIS: Denies. FEVER/CHILLS: Denies. APPETITE CHANGE: Denies. RECENT UNINTENDED WEIGHT LOSS: Denies. DIFFICULTY SWALLOWING/PHARYNGITIS/VOICE CHANGE: Denies. Review of additional significant medical hx:  A-FIB/ATRIAL FLUTTER, HLD, HX OF SYNCOPE (patient states years ago), HX OF CHF, HX OF PERICARDIAL EFFUSION: Hx of x2 cardioversion's and an ablation- hx of seeing Dr. Jigna Posada, will be seeing new cardiologist in July. Patient states irregular heart beat resolved. Has loop recorder- patient states non-functioning. Denies current chest pain, palpitations, SOB, dizziness, leg swelling, headache. Current medications r/t condition: [place]    ECHO, 4-26-17:  CONCLUSIONS     Summary  Limited study to check EF. Left ventricle is normal in size and wall thickness. Global left ventricular systolic function is normal.  Estimated ejection fraction is 55 % . No significant wall motion abnormality seen.     Signature  ----------------------------------------------------------------------------   Electronically signed by Karime Muñiz(Sonographer) on 04/26/2017 02:49   PM    BRAULIO: Uses CPAP nightly. NPO status: Patient states they have been NPO since before midnight. Medications taken TODAY (with sip of water): None. Anticoagulation status: Patient denies taking any anti-coagulants, including aspirin currently. Prep fully completed: YES.     Pertinent family hx: Denies family hx of esophageal and colon CA. Denies personal hx of blood clots. Denies personal hx of MRSA infection. Denies any personal or family hx of previous complications w/anesthesia.   PAST MEDICAL HISTORY     Past Medical History:   Diagnosis Date    Atrial fibrillation ([facility]     Atrial flutter ([facility] 03/26/2017    CHF (congestive heart failure) (HCC)     Hx of per [facility] chart    Chronic systolic dysfunction of left ventricle 04/06/2017    Colon polyp     Hyperlipidemia     Left ventricular dysfunction 04/06/2017    Nonsustained ventricular tachycardia ([facility] 04/26/2017    BRAULIO on CPAP 06/08/2021    Pericardial effusion     Hx of per [facility] chart    Syncope     Vision abnormalities     glasses       SURGICAL HISTORY       Past Surgical History:   Procedure Laterality Date    ABLATION OF DYSRHYTHMIC FOCUS Bilateral 2017    Dr. Zaina Mantilla  03/30/2017    [facility]    CARDIOVERSION N/A 03/30/2017    CARDIOVERSION performed by Juliana Pink MD at [address] [place] [address]  04/07/2017    With Dr. Donald Manzo  02/21/2015    [address] [place] [address] tooth implant- top    HYSTERECTOMY  07/2010    Hysterectomy for heavy menses- denies history of abnormal pap smears    INSERTABLE CARDIAC MONITOR  2017    Loop recorder in place (patient states non-working, discussed 6-6-22)    PILONIDAL CYST EXCISION      TONSILLECTOMY AND ADENOIDECTOMY  1968    TRANSESOPHAGEAL ECHOCARDIOGRAM N/A 03/30/2017    TRANSESOPHAGEAL ECHOCARDIOGRAM performed by Juliana Pink MD at [place] [address] History     Socioeconomic History    Marital status:      Spouse name: None    Number of children: None    Years of education: None    Highest education level: None   Occupational History    None   Tobacco Use    Smoking status: Never Smoker    Smokeless tobacco: Never Used   Vaping Use    Vaping Use: Never used   Substance and Sexual Activity    Alcohol use: Yes     Alcohol/week: 0.0 standard drinks     Comment: OCC    Drug use: No    Sexual activity: Yes     Partners: Male     Birth control/protection: Surgical     Comment: hyst   Other Topics Concern    None   Social History Narrative    None     Social Determinants of Health     Financial Resource Strain: Low Risk     Difficulty of Paying Living Expenses: Not hard at all   Food Insecurity: No Food Insecurity    Worried About Running Out of Food in the Last Year: Never true    [address] in the Last Year: Never true   Transportation Needs:     Lack of Transportation (Medical): Not on file    Lack of Transportation (Non-Medical): Not on file   Physical Activity:     Days of Exercise per Week: Not on file    Minutes of Exercise per Session: Not on file   Stress:     Feeling of Stress : Not on file   Social Connections:     Frequency of Communication with Friends and Family: Not on file    Frequency of Social Gatherings with Friends and Family: Not on file    Attends Latter-day Services: Not on file    Active Member of [address] [place] [address] or Organizations: Not on file    Attends Club or Organization Meetings: Not on file    Marital Status: Not on file   Intimate Partner Violence:     Fear of Current or Ex-Partner: Not on file    Emotionally Abused: Not on file    Physically Abused: Not on file    Sexually Abused: Not on file   Housing Stability:     Unable to Pay for Housing in the Last Year: Not on file    Number of [place] in the Last Year: Not on file    Unstable Housing in the Last Year: Not on file       REVIEW OF SYSTEMS    No Known Allergies    No current facility-administered medications on file prior to encounter.      Current Outpatient Medications on File Prior to Encounter   Medication Sig Dispense Refill    ezetimibe (ZETIA) 10 MG tablet Take 1 tablet by mouth daily 90 tablet 1    CPAP Machine MISC use as directed      ibuprofen (ADVIL;MOTRIN) 800 MG tablet Take 1 tablet by mouth 3 times daily as needed      Azelaic Acid 15 % GEL APPLY TO FACE ONCE A DAY as directed by prescriber (Patient not taking: Reported on 7/15/2021)  0    Cholecalciferol (VITAMIN D3) 5000 UNITS TABS Take 1 each by mouth daily         Review of Systems   Constitutional: Negative for chills and fever. HENT: Negative for congestion, ear pain, rhinorrhea, sore throat and trouble swallowing. Respiratory: Negative for cough, shortness of breath and wheezing. Cardiovascular: Negative for chest pain, palpitations and leg swelling. Gastrointestinal:        SEE HPI. Genitourinary: Negative for dysuria and frequency. Neurological: Negative for dizziness and headaches. Hematological: Does not bruise/bleed easily. GENERAL PHYSICAL EXAM     Vitals: Review current vital signs per RN flow sheet. /70   Pulse 70   Temp 97.7 °F (36.5 °C) (Infrared)   Resp 16   Ht 5' 8\" (1.727 m)   Wt 195 lb (88.5 kg)   SpO2 97%   BMI 29.65 kg/m²     GENERAL APPEARANCE:   Rayne Hull is 61 y.o.,  female, mildly obese, nourished, conscious, alert. Does not appear to be in any distress or pain at this time. Physical Exam  Constitutional:       General: She is not in acute distress. Appearance: She is well-developed. She is not ill-appearing, toxic-appearing or diaphoretic. HENT:      Head: Normocephalic. Right Ear: External ear normal.      Left Ear: External ear normal.      Nose: Nose normal.      Mouth/Throat:      Dentition: Abnormal dentition (x1 dental implant- top). Pharynx: No oropharyngeal exudate or posterior oropharyngeal erythema. Tonsils: No tonsillar abscesses. Eyes:      General:         Right eye: No discharge. Left eye: No discharge. Cardiovascular:      Rate and Rhythm: Normal rate and regular rhythm. Pulses: Intact distal pulses. Heart sounds: Normal heart sounds.    Pulmonary:      Effort: Pulmonary effort is normal. No accessory muscle usage or respiratory distress. Breath sounds: Normal breath sounds. No decreased breath sounds, wheezing, rhonchi or rales. Abdominal:      General: Bowel sounds are normal. There is no distension. Palpations: Abdomen is soft. There is no mass. Tenderness: There is no abdominal tenderness. There is no guarding or rebound. Musculoskeletal:      Right lower leg: No swelling or tenderness. Left lower leg: No swelling or tenderness. Comments: Negative Jonah's sign b/l. Skin:     General: Skin is warm and dry. Comments: Small area of hyperpigmentation to upper chest- patient states birth jabier. Neurological:      Mental Status: She is alert and oriented to person, place, and time.    Psychiatric:         Behavior: Behavior normal.         RECENT LAB WORK     Lab Results   Component Value Date     05/26/2022    K 4.3 05/26/2022     05/26/2022    CO2 27 05/26/2022    BUN 12 05/26/2022    CREATININE 0.66 05/26/2022    GLUCOSE 110 (H) 05/26/2022    CALCIUM 9.4 05/26/2022    PROT 7.1 08/13/2020    LABALBU 4.6 08/13/2020    BILITOT 0.42 08/13/2020    ALKPHOS 71 08/13/2020    AST 23 04/02/2021    ALT 30 04/02/2021    LABGLOM >60 05/26/2022    GFRAA >60 05/26/2022    GLOB NOT REPORTED 04/26/2017     Lab Results   Component Value Date    WBC 4.1 05/26/2022    HGB 13.6 05/26/2022    HCT 40.0 05/26/2022    MCV 87.4 05/26/2022     05/26/2022     PROVISIONAL DIAGNOSES / SURGERY:      HISTORY OF POLYPS    COLONOSCOPY DIAGNOSTIC    Patient Active Problem List    Diagnosis Date Noted    BRAULIO on CPAP 06/08/2021    Apnea 07/16/2018    Presence of other cardiac implants and grafts 12/20/2017    Abnormal vision 09/11/2017    Nonsustained ventricular tachycardia ([facility] [address] 04/26/2017    Paroxysmal atrial fibrillation ([facility] 04/26/2017    Left ventricular dysfunction 04/06/2017    Chronic systolic dysfunction of left ventricle 04/06/2017    Atrial flutter ([facility] 03/26/2017    Palpitations 03/30/2016    Hypermetropia 09/20/2012    Presbyopia 09/20/2012    Regular astigmatism 09/20/2012    Tear film insufficiency 09/20/2012           TERESA Arboleda CNP on 6/6/2022 at 6:33 AM

## 2022-06-06 NOTE — ANESTHESIA POSTPROCEDURE EVALUATION
Department of Anesthesiology  Postprocedure Note    Patient: Deanne Velazquez  MRN: [id number]  YOB: 1965  Date of evaluation: 6/6/2022  Time:  11:00 AM     Procedure Summary     Date: 06/06/22 Room / Location: [facility] ENDO 04 / [facility] ENDO    Anesthesia Start: 6497 Anesthesia Stop: 2274    Procedure: COLONOSCOPY DIAGNOSTIC (N/A ) Diagnosis:       (HISTORY OF POLYPS)      (PT VACCINATED)    Surgeons: Kayden Ryder MD Responsible Provider: Josephine Mitchell MD    Anesthesia Type: general ASA Status: 3          Anesthesia Type: No value filed. Bryanna Phase I: Bryanna Score: 10    Bryanna Phase II: Bryanna Score: 10    Last vitals: Reviewed and per EMR flowsheets.        Anesthesia Post Evaluation    Comments: POST- ANESTHESIA EVALUATION       Pt Name: Deanne Velazquez  MRN: [id number]  YOB: 1965  Date of evaluation: 6/6/2022  Time:  11:00 AM      /69   Pulse 55   Temp 97 °F (36.1 °C) (Infrared)   Resp 16   Ht 5' 8\" (1.727 m)   Wt 195 lb (88.5 kg)   SpO2 98%   BMI 29.65 kg/m²      Consciousness Level  Awake  Cardiopulmonary Status  Stable  Pain Adequately Treated YES  Nausea / Vomiting  NO  Adequate Hydration  YES  Anesthesia Related Complications NONE      Electronically signed by Josephine Mitchell MD on 6/6/2022 at 11:00 AM

## 2022-06-06 NOTE — ANESTHESIA PRE PROCEDURE
Department of Anesthesiology  Preprocedure Note       Name:  Jeaneth Howard   Age:  61 y.o.  :  1965                                          MRN:  [id number]         Date:  2022      Surgeon: Jayesh Garcia):  Carole Quinteros MD    Procedure: Procedure(s):  COLONOSCOPY DIAGNOSTIC    Medications prior to admission:   Prior to Admission medications    Medication Sig Start Date End Date Taking?  Authorizing Provider   ezetimibe (ZETIA) 10 MG tablet Take 1 tablet by mouth daily 2/10/22 5/11/22  Janine Diaz MD   CPAP Machine MISC use as directed    Historical Provider, MD   ibuprofen (ADVIL;MOTRIN) 800 MG tablet Take 1 tablet by mouth 3 times daily as needed 10/5/20   Historical Provider, MD   Azelaic Acid 15 % GEL APPLY TO FACE ONCE A DAY as directed by prescriber  Patient not taking: Reported on 7/15/2021 9/10/19   Historical Provider, MD   Cholecalciferol (VITAMIN D3) 5000 UNITS TABS Take 1 each by mouth daily    Historical Provider, MD       Current medications:    Current Facility-Administered Medications   Medication Dose Route Frequency Provider Last Rate Last Admin    lidocaine PF 1 % injection 1 mL  1 mL IntraDERmal Once PRN Vinny Lowry MD        lactated ringers infusion   IntraVENous Continuous Aundreaita Breath,  mL/hr at 22 0640 New Bag at 22 0640    sodium chloride flush 0.9 % injection 5-40 mL  5-40 mL IntraVENous 2 times per day Vinny Lowry MD        sodium chloride flush 0.9 % injection 5-40 mL  5-40 mL IntraVENous PRN Vinny Breath, MD        0.9 % sodium chloride infusion   IntraVENous PRN Vinny Lowry MD           Allergies:  No Known Allergies    Problem List:    Patient Active Problem List   Diagnosis Code    Abnormal vision H53.9    Palpitations R00.2    Atrial flutter ([address] I48.92    Presence of other cardiac implants and grafts Z95.818    Left ventricular dysfunction I51.9    Nonsustained ventricular tachycardia (HCC) I47.2    Apnea R06.81    Chronic systolic dysfunction of left ventricle I51.9    Hypermetropia H52.00    Paroxysmal atrial fibrillation (HCC) I48.0    Presbyopia H52.4    Regular astigmatism H52.229    Tear film insufficiency H04.129    BRAULIO on CPAP G47.33, Z99.89       Past Medical History:        Diagnosis Date    Atrial fibrillation (HCC)     Atrial flutter (HCC) 03/26/2017    CHF (congestive heart failure) (HCC)     Hx of per [facility] chart    Chronic systolic dysfunction of left ventricle 04/06/2017    Colon polyp     Hyperlipidemia     Left ventricular dysfunction 04/06/2017    Nonsustained ventricular tachycardia (HCC) 04/26/2017    BRAULIO on CPAP 06/08/2021    Pericardial effusion     Hx of per [facility] chart    Syncope     Vision abnormalities     glasses       Past Surgical History:        Procedure Laterality Date    ABLATION OF DYSRHYTHMIC FOCUS Bilateral 2017    Dr. Yanick Vega  03/30/2017    [facility]    CARDIOVERSION N/A 03/30/2017    CARDIOVERSION performed by Otto Love MD at [address] [place] [address] 169  04/07/2017    With Dr. Leisa Knox COLONOSCOPY  02/21/2015    BIOPSY    DENTAL SURGERY      x1 tooth implant- top    HYSTERECTOMY  07/2010    Hysterectomy for heavy menses- denies history of abnormal pap smears    INSERTABLE CARDIAC MONITOR  2017    Loop recorder in place (patient states non-working, discussed 6-6-22)    PILONIDAL CYST EXCISION      TONSILLECTOMY AND ADENOIDECTOMY  1968    TRANSESOPHAGEAL ECHOCARDIOGRAM N/A 03/30/2017    TRANSESOPHAGEAL ECHOCARDIOGRAM performed by Otto Love MD at [address] [place] [address] History:    Social History     Tobacco Use    Smoking status: Never Smoker    Smokeless tobacco: Never Used   Substance Use Topics    Alcohol use:  Yes     Alcohol/week: 0.0 standard drinks     [id number]                                Counseling given: Not Answered      Vital Signs (Current):   Vitals:    06/06/22 0622   BP: 111/70 Pulse: 70   Resp: 16   Temp: 97.7 °F (36.5 °C)   TempSrc: Infrared   SpO2: 97%   Weight: 195 lb (88.5 kg)   Height: 5' 8\" (1.727 m)                                              BP Readings from Last 3 Encounters:   06/06/22 111/70   04/21/22 120/78   01/24/22 126/80       NPO Status: Time of last liquid consumption: 2100                        Time of last solid consumption: 1800                        Date of last liquid consumption: 06/05/22                        Date of last solid food consumption: 06/04/22    BMI:   Wt Readings from Last 3 Encounters:   06/06/22 195 lb (88.5 kg)   05/23/22 195 lb (88.5 kg)   04/21/22 198 lb (89.8 kg)     Body mass index is 29.65 kg/m². CBC:   Lab Results   Component Value Date    WBC 4.1 05/26/2022    RBC 4.57 05/26/2022    HGB 13.6 05/26/2022    HCT 40.0 05/26/2022    MCV 87.4 05/26/2022    RDW 13.0 05/26/2022     05/26/2022       CMP:   Lab Results   Component Value Date     05/26/2022    K 4.3 05/26/2022     05/26/2022    CO2 27 05/26/2022    BUN 12 05/26/2022    CREATININE 0.66 05/26/2022    GFRAA >60 05/26/2022    LABGLOM >60 05/26/2022    GLUCOSE 110 05/26/2022    PROT 7.1 08/13/2020    CALCIUM 9.4 05/26/2022    BILITOT 0.42 08/13/2020    ALKPHOS 71 08/13/2020    AST 23 04/02/2021    ALT 30 04/02/2021       POC Tests: No results for input(s): POCGLU, POCNA, POCK, POCCL, POCBUN, POCHEMO, POCHCT in the last 72 hours.     Coags:   Lab Results   Component Value Date    PROTIME 11.7 04/26/2017    INR 1.1 04/26/2017       HCG (If Applicable): No results found for: PREGTESTUR, PREGSERUM, HCG, HCGQUANT     ABGs: No results found for: PHART, PO2ART, KNX4PKJ, RWY1LQC, BEART, P8RTJHYS     Type & Screen (If Applicable):  No results found for: LABABO, LABRH    Drug/Infectious Status (If Applicable):  No results found for: HIV, HEPCAB    COVID-19 Screening (If Applicable): No results found for: COVID19        Anesthesia Evaluation  Patient summary reviewed and Nursing notes reviewed no history of anesthetic complications:   Airway: Mallampati: II  TM distance: >3 FB   Neck ROM: full  Mouth opening: > = 3 FB   Dental: normal exam         Pulmonary:normal exam  breath sounds clear to auscultation  (+) sleep apnea: on CPAP,                             Cardiovascular:    (+) dysrhythmias (Paroxysmal atrial fibrillation  -Hx of x2 cardioversion's and an ablation - 2017): atrial fibrillation and atrial flutter, CHF:,       ECG reviewed  Rhythm: regular  Rate: normal  Echocardiogram reviewed               ROS comment: Left ventricle is normal in size and wall thickness. Global left ventricular systolic function is normal.  Estimated ejection fraction is 55 % . (2017)    H/o Loop Recorder     Neuro/Psych:   Negative Neuro/Psych ROS              GI/Hepatic/Renal:             Endo/Other:                     Abdominal:             Vascular: negative vascular ROS. Other Findings:           Anesthesia Plan      general     ASA 3       Induction: intravenous. MIPS: Prophylactic antiemetics administered. Anesthetic plan and risks discussed with patient. Plan discussed with CRNA.                     Heidi Singleton MD   6/6/2022

## 2022-06-06 NOTE — OP NOTE
Operative Note      Patient: Diana Schulte  YOB: 1965  MRN: [id number]    Date of Procedure: 6/6/2022    PROCEDURE NOTE    DATE OF PROCEDURE: 6/6/2022    SURGEON: Bucky Hardy MD    ASSISTANT: None    PREOPERATIVE DIAGNOSIS: History of colon polyp    POSTOPERATIVE DIAGNOSIS: Fair prep otherwise unremarkable colonoscopy    OPERATION: Total colonoscopy to cecum with intubation of terminal ileum    ANESTHESIA: General    ESTIMATED BLOOD LOSS: None    COMPLICATIONS: None     SPECIMENS:  Was Not Obtained    HISTORY: The patient is a 61y.o. year old female with history of above preop diagnosis. I recommended colonoscopy with possible biopsy or polypectomy and I explained the risk, benefits, expected outcome, and alternatives to the procedure. Risks included but are not limited to bleeding, infection, respiratory distress, hypotension, and perforation of the colon and possibility of missing a lesion. The patient understands and is in agreement. PROCEDURE: The patient was given IV conscious sedation. The patient's SPO2 remained above 90% throughout the procedure. Digital rectal exam was normal.  The colonoscope was inserted through the anus into the rectum and advanced under direct vision to the cecum without difficulty. Terminal ileum was examined for approximately 2 inches. The prep was fair. Findings:  Terminal ileum: normal    Cecum/Ascending colon: normal    Transverse colon: normal    Descending/Sigmoid colon: normal    Rectum/Anus: examined in normal and retroflexed positions and was normal    Withdrawal Time was (minutes): 20      Next screening colonoscopy: 5 years. If screening is less than 10 years the recommended reason is due:Fair preparation    The colon was decompressed. While withdrawing the scope the above findings were verified and the scope was removed. The patient tolerated the procedure and conscious sedation without unusual events.     In the recovery room patient was examined and remains hemodynamically stable. Discharge home when criteria met. Recommendations/Plan:   1. Discussed with the family  2. High fiber diet   3.  Precautions to avoid constipation    Electronically signed by Kerry Marinelli MD  on 6/6/2022 at 8:16 AM

## 2022-06-28 ENCOUNTER — HOSPITAL ENCOUNTER (OUTPATIENT)
Age: 57
Discharge: HOME | End: 2022-06-28
Payer: COMMERCIAL

## 2022-06-28 DIAGNOSIS — E55.9 VITAMIN D DEFICIENCY: ICD-10-CM

## 2022-06-28 DIAGNOSIS — E78.00 HYPERCHOLESTEREMIA: ICD-10-CM

## 2022-06-28 LAB
CHOLESTEROL/HDL RATIO: 3.6
CHOLESTEROL: 217 MG/DL
HDLC SERPL-MCNC: 61 MG/DL
LDL CHOLESTEROL: 137 MG/DL (ref 0–130)
TRIGL SERPL-MCNC: 93 MG/DL
VITAMIN D 25-HYDROXY: 40 NG/ML

## 2022-06-28 PROCEDURE — 36415 COLL VENOUS BLD VENIPUNCTURE: CPT

## 2022-06-28 PROCEDURE — 80061 LIPID PANEL: CPT

## 2022-06-28 PROCEDURE — 82306 VITAMIN D 25 HYDROXY: CPT

## 2022-10-01 ENCOUNTER — HOSPITAL ENCOUNTER (OUTPATIENT)
Age: 57
Discharge: HOME | End: 2022-10-01
Payer: COMMERCIAL

## 2022-10-01 LAB
ALT SERPL-CCNC: 23 U/L (ref 5–33)
AST SERPL-CCNC: 22 U/L
CHOLESTEROL, FASTING: 119 MG/DL
CHOLESTEROL/HDL RATIO: 2.2
HDLC SERPL-MCNC: 53 MG/DL
LDL CHOLESTEROL: 57 MG/DL (ref 0–130)
TOTAL CK: 77 U/L (ref 26–192)
TRIGLYCERIDE, FASTING: 45 MG/DL

## 2022-10-01 PROCEDURE — 80061 LIPID PANEL: CPT

## 2022-10-01 PROCEDURE — 84450 TRANSFERASE (AST) (SGOT): CPT

## 2022-10-01 PROCEDURE — 84460 ALANINE AMINO (ALT) (SGPT): CPT

## 2022-10-01 PROCEDURE — 36415 COLL VENOUS BLD VENIPUNCTURE: CPT

## 2022-10-01 PROCEDURE — 82550 ASSAY OF CK (CPK): CPT

## 2023-01-25 ENCOUNTER — OFFICE VISIT (OUTPATIENT)
Dept: OBGYN CLINIC | Age: 58
End: 2023-01-25
Payer: COMMERCIAL

## 2023-01-25 VITALS
WEIGHT: 200 LBS | BODY MASS INDEX: 30.31 KG/M2 | SYSTOLIC BLOOD PRESSURE: 122 MMHG | DIASTOLIC BLOOD PRESSURE: 66 MMHG | HEIGHT: 68 IN

## 2023-01-25 DIAGNOSIS — Z01.419 WELL FEMALE EXAM WITH ROUTINE GYNECOLOGICAL EXAM: Primary | ICD-10-CM

## 2023-01-25 DIAGNOSIS — Z12.39 ENCOUNTER FOR OTHER SCREENING FOR MALIGNANT NEOPLASM OF BREAST: ICD-10-CM

## 2023-01-25 PROCEDURE — 99396 PREV VISIT EST AGE 40-64: CPT | Performed by: NURSE PRACTITIONER

## 2023-01-25 PROCEDURE — G8482 FLU IMMUNIZE ORDER/ADMIN: HCPCS | Performed by: NURSE PRACTITIONER

## 2023-01-25 RX ORDER — EZETIMIBE 10 MG/1
TABLET ORAL
COMMUNITY
Start: 2023-01-21

## 2023-01-25 ASSESSMENT — PATIENT HEALTH QUESTIONNAIRE - PHQ9
1. LITTLE INTEREST OR PLEASURE IN DOING THINGS: 0
SUM OF ALL RESPONSES TO PHQ QUESTIONS 1-9: 0
SUM OF ALL RESPONSES TO PHQ9 QUESTIONS 1 & 2: 0
SUM OF ALL RESPONSES TO PHQ QUESTIONS 1-9: 0
SUM OF ALL RESPONSES TO PHQ QUESTIONS 1-9: 0
2. FEELING DOWN, DEPRESSED OR HOPELESS: 0
SUM OF ALL RESPONSES TO PHQ QUESTIONS 1-9: 0

## 2023-01-25 NOTE — PROGRESS NOTES
History and Physical  [address], [address] [facility] [address] [phone]   Fax [phone]  Amado Moser  2023              61 y.o. Chief Complaint   Patient presents with    Annual Exam       No LMP recorded. Patient has had a hysterectomy. Primary Care Physician: Vladimir Gore MD    The patient was seen and examined. She has no chief complaint today and is here for her annual exam.  Her bowels are regular. There are no voiding complaints. She denies any bloating. She denies vaginal discharge and was counseled on STD's and the need for barrier contraception. HPI : Amado Moser is a 61 y.o. female [id number]    Annual exam  Hx of hysterectomy with ovarian preservation in  for heavy menses. Denies hx of cervical dysplasia. No chief complaint.     no Bloating  no Early Satiety  no Unexplained weight change of more than 15 lbs  no  PMB  no  PCB  ________________________________________________________________________  OB History    Para Term  AB Living   3 1 1 0 2 1   SAB IAB Ectopic Molar Multiple Live Births   2 0 0 0 0 1      # Outcome Date GA Lbr Acosta/2nd Weight Sex Delivery Anes PTL Lv   3 SAB            2 SAB            1 Term    8 lb 7 oz (3.827 kg) F Vag-Spont   ZOEY     Past Medical History:   Diagnosis Date    Atrial fibrillation (HCC)     Atrial flutter ([facility] [address] 2017    CHF (congestive heart failure) (HCC)     Hx of per [facility] chart    Chronic systolic dysfunction of left ventricle 2017    Colon polyp     Hyperlipidemia     Left ventricular dysfunction 2017    Nonsustained ventricular tachycardia 2017    BRAULIO on CPAP 2021    Pericardial effusion     Hx of per [facility] chart    Syncope     Vision abnormalities     glasses                                                                   Past Surgical History:   Procedure Laterality Date    ABLATION OF DYSRHYTHMIC FOCUS Bilateral 2017    Dr. Cephus Denver  2017    LILLY    CARDIOVERSION N/A 2017    CARDIOVERSION performed by Nano Randall MD at [address]  2017    With Dr. Carol Best    COLONOSCOPY  2015    BIOPSY    COLONOSCOPY N/A 2022    COLONOSCOPY DIAGNOSTIC performed by Leonora Costa MD at [address]      x1 tooth implant- top    HYSTERECTOMY ([address] [address])  2010    Hysterectomy for heavy menses- denies history of abnormal pap smears    INSERTABLE CARDIAC MONITOR      Loop recorder in place (replaced 2022    PILONIDAL CYST EXCISION      TONSILLECTOMY AND ADENOIDECTOMY  1968    TRANSESOPHAGEAL ECHOCARDIOGRAM N/A 2017    TRANSESOPHAGEAL ECHOCARDIOGRAM performed by Nano Randall MD at [facility] History   Problem Relation Age of Onset    Other Sister         Hx of kidney and pancreas transplant d/t DM    Diabetes Sister     Breast Cancer Neg Hx     Cancer Neg Hx     Colon Cancer Neg Hx     Eclampsia Neg Hx     Hypertension Neg Hx     Ovarian Cancer Neg Hx      Labor Neg Hx     Spont Abortions Neg Hx     Stroke Neg Hx      Social History     Socioeconomic History    Marital status:      Spouse name: Not on file    Number of children: Not on file    Years of education: Not on file    Highest education level: Not on file   Occupational History    Not on file   Tobacco Use    Smoking status: Never    Smokeless tobacco: Never   Vaping Use    Vaping Use: Never used   Substance and Sexual Activity    Alcohol use:  Yes     Alcohol/week: 0.0 standard drinks     Comment: OCC    Drug use: No    Sexual activity: Yes     Partners: Male     Birth control/protection: Surgical     Comment: hyst   Other Topics Concern    Not on file   Social History Narrative    Not on file     Social Determinants of Health     Financial Resource Strain: Low Risk     Difficulty of Paying Living Expenses: Not hard at all Food Insecurity: No Food Insecurity    Worried About Running Out of Food in the Last Year: Never true    Ran Out of Food in the Last Year: Never true   Transportation Needs: Not on file   Physical Activity: Not on file   Stress: Not on file   Social Connections: Not on file   Intimate Partner Violence: Not on file   Housing Stability: Not on file       MEDICATIONS:  Current Outpatient Medications   Medication Sig Dispense Refill    rosuvastatin (CRESTOR) 10 MG tablet Take 1 tablet by mouth nightly 90 tablet 1    CPAP Machine MISC use as directed      ibuprofen (ADVIL;MOTRIN) 800 MG tablet Take 1 tablet by mouth 3 times daily as needed      Azelaic Acid 15 % GEL APPLY TO FACE ONCE A DAY as directed by prescriber  0    Cholecalciferol (VITAMIN D3) 5000 UNITS TABS Take 1 each by mouth daily      ezetimibe (ZETIA) 10 MG tablet  (Patient not taking: Reported on 1/25/2023)       No current facility-administered medications for this visit. ALLERGIES:  Allergies as of 01/25/2023    (No Known Allergies)       Symptoms of decreased mood absent  Symptoms of anhedonia absent    **If either question is answered in a  positive fashion then complete the PHQ9 Scoring Evaluation and make the appropriate referral**      Immunization status: stated as current, but no records available. Gynecologic History:  Menarche: 14 yo  Menopause at 89 yo     No LMP recorded. Patient has had a hysterectomy. Sexually Active: Yes    STD History: No     Permanent Sterilization: Yes hyst   Reversible Birth Control: No        Hormone Replacement Exposure: No      Genetic Qualified Family History of Breast, Ovarian , Colon or Uterine Cancer: No (paternal aunt with breast cancer 46s)     If YES see scanned worksheet.     Preventative Health Testing:    Health Maintenance:  Health Maintenance Due   Topic Date Due    Shingles vaccine (1 of 2) Never done       Date of Last Pap Smear: 12/4/2014 negative  Abnormal Pap Smear History: denies  Colposcopy History:   Date of Last Mammogram: 3/8/2022 negative  Date of Last Colonoscopy: 6/2022 normal- repeat in 10 years  Date of Last Bone Density:      ________________________________________________________________________        REVIEW OF SYSTEMS:    yes   A minimum of an eleven point review of systems was completed. Review Of Systems (11 point):  Constitutional: No fever, chills or malaise; No weight change or fatigue  Head and Eyes: No vision changes, Headache, Dizziness or trauma in last 12 months  ENT ROS: No hearing, Tinnitis, sinus or taste problems  Hematological and Lymphatic ROS:No Lymphoma, Von Willebrand's, Hemophillia or Bleeding History  Psych ROS: No Depression, Homicidal thoughts,suicidal thoughts, or anxiety  Breast ROS: No breast abnormalities or lumps  Respiratory ROS: No SOB, Pneumoniae,Cough, or Pulmonary Embolism   Cardiovascular ROS: No Chest Pain with Exertion, Palpitations, Syncope, Edema, Arrhythmia. + has loop recorder/ hx of A-fib  Gastrointestinal ROS: No Indigestion, Heartburn, Nausea, vomiting, Diarrhea, Constipation,or Bowel Changes; No Bloody Stools or melena  Genito-Urinary ROS: No Dysuria, Hematuria or Nocturia.  No Urinary Incontinence or Vaginal Discharge  Musculoskeletal ROS: No Arthralgia, Arthritis,Gout,Osteoporosis or Rheumatism  Neurological ROS: No CVA, Migraines, Epilepsy, Seizure Hx, or Limb Weakness  Dermatological ROS: No Rash, Itching, Hives, Mole Changes or Cancer                                                                                                                                                                                                                                  PHYSICAL Exam:     Constitutional:  Vitals:    01/25/23 1003   BP: 122/66   Site: Right Upper Arm   Position: Sitting   Cuff Size: Medium Adult   Weight: 200 lb (90.7 kg)   Height: 5' 8\" (1.727 m)       Chaperone for Intimate Exam  Chaperone was offered and accepted as part of the rooming process. Chaperone: Catarina          General Appearance: This  is a well Developed, well Nourished, well groomed female. Her BMI was reviewed. Nutritional decision making was discussed. Skin:  There was a Normal Inspection of the skin without rashes or lesions. There were no rashes. (Papular, Maculopapular, Hives, Pustular, Macular)     There were no lesions (Ulcers, Erythema, Abn. Appearing Nevi)            Lymphatic:  No Lymph Nodes were Palpable in the neck , axilla or groin.  0 # Of Lymph Nodes; Location ; Character [Normal]  [Shotty] [Tender] [Enlarged]     Neck and EENT:  The neck was supple. There were no masses   The thyroid was not enlarged and had no masses. Perrla, EOMI B/L, TMI B/L No Abnormalities. Throat inspected-No exudates or Masses, Nares Patent No Masses        Respiratory: The lungs were auscultated and found to be clear. There were no rales, rhonchi or wheezes. There was a good respiratory effort. Cardiovascular: The heart was in a regular rate and rhythm. . No S3 or S4. There was no murmur appreciated. Location, grade, and radiation are not applicable. Extremities: The patients extremities were without calf tenderness, edema, or varicosities. There was full range of motion in all four extremities. Pulses in all four extremities were appreciated and are 2/4. Abdomen: The abdomen was soft and non-tender. There were good bowel sounds in all quadrants and there was no guarding, rebound or rigidity. On evaluation there was no evidence of hepatosplenomegaly and there was no costal vertebral lesa tenderness bilaterally. No hernias were appreciated. Abdominal Scars: CW previous surgeries    Psych:   The patient had a normal Orientation to: Time, Place, Person, and Situation  There is no Mood / Affect changes    Breast:  (Chest)  normal appearance, no masses or tenderness, No nipple retraction or dimpling, No nipple discharge or bleeding, No axillary or supraclavicular adenopathy, Normal to palpation without dominant masses  Self breast exams were reviewed in detail. Literature was given. Pelvic Exam:  External genitalia: normal general appearance  Urinary system: urethral meatus normal  Vaginal: normal mucosa without prolapse or lesions  Cervix: normal appearance  Adnexa: normal bimanual exam  Uterus: normal single, nontender    Rectal Exam:  exam declined by patient          Musculosk:  Normal Gait and station was noted. Digits were evaluated without abnormal findings. Range of motion, stability and strength were evaluated and found to be appropriate for the patients age. ASSESSMENT:      61 y.o. Annual   Diagnosis Orders   1. Well female exam with routine gynecological exam  JACINTA SAKINA DIGITAL SCREEN BILATERAL      2. Encounter for other screening for malignant neoplasm of breast  JACINTA SAKINA DIGITAL SCREEN BILATERAL             Chief Complaint   Patient presents with    Annual Exam          Past Medical History:   Diagnosis Date    Atrial fibrillation ([address]     Atrial flutter ([address] 03/26/2017    CHF (congestive heart failure) (HCC)     Hx of per [facility] chart    Chronic systolic dysfunction of left ventricle 04/06/2017    Colon polyp     Hyperlipidemia     Left ventricular dysfunction 04/06/2017    Nonsustained ventricular tachycardia 04/26/2017    BRAULIO on CPAP 06/08/2021    Pericardial effusion     Hx of per [facility] chart    Syncope     Vision abnormalities     glasses         Patient Active Problem List    Diagnosis Date Noted    BRAULIO on CPAP 06/08/2021    Apnea 07/16/2018    Presence of other cardiac implants and grafts 12/20/2017     Loop recorder present.       Abnormal vision 09/11/2017     glasses  Overview:   Overview:   glasses    Formatting of this note might be different from the original.  Overview:   glasses      Nonsustained ventricular tachycardia 04/26/2017    Paroxysmal atrial fibrillation ([address] 04/26/2017    Left ventricular dysfunction 04/06/2017    Chronic systolic dysfunction of left ventricle 04/06/2017    Atrial flutter ([facility] [address] 03/26/2017    Palpitations 03/30/2016    Hypermetropia 09/20/2012    Presbyopia 09/20/2012    Regular astigmatism 09/20/2012    Tear film insufficiency 09/20/2012          Hereditary Breast, Ovarian, Colon and Uterine Cancer screening Done. Tobacco & Secondary smoke risks reviewed; instructed on cessation and avoidance      Counseling Completed:  Preventative Health Recommendations and Follow up. The patient was informed of the recommended preventative health recommendations. 1. Annuals every year; Cytology collections per prevailing guidelines. 2. Mammograms begin every year at 37 yo if no abnormalities are found and no family history. 3. Bone density studies every 2-3 years. Begin at 71 yo. If no fracture history or osteoporosis family history. (significant). 4. Colonoscopy begin at 38 yo. Repeat every ten years if negative and no family history. 5. Calcium of 8091-2612 mg/day in split dosing  6. Vitamin D 400-800 IU/day  7. All other preventative health recommendations will be managed by the patients Primary care physician. PLAN:  Return in about 1 year (around 1/25/2024) for annual.  Screening mammogram ordered. Repeat Annual every 1 year  Cervical Cytology Evaluation begins at 24years old. If Negative Cytology, Follow-up screening per current guidelines. Mammograms every 1 year. If 37 yo and last mammogram was negative. Calcium and Vitamin D dosing reviewed. Colonoscopy screening reviewed as well as onset for bone density testing. Birth control and barrier recommendations discussed. STD counseling and prevention reviewed. Gardisil counseling completed for all patients 10-37 yo. Routine health maintenance per patients PCP.   Orders Placed This Encounter   Procedures    JACINTA SAKINA DIGITAL SCREEN BILATERAL     Standing Status:   Future     Standing Expiration Date:   3/25/2024     Order Specific Question:   Reason for exam:     Answer:   screening for malignant neoplasm of breast           The patient, Suellen Coronado is a 61 y.o. female, was seen with a total time spent of 20 minutes for the visit on this date of service by the E/M provider. The time component had both face to face and non face to face time spent in determining the total time component. Counseling and education regarding her diagnosis listed below and her options regarding those diagnoses were also included in determining her time component. Diagnosis Orders   1. Well female exam with routine gynecological exam  JACINTA SAKINA DIGITAL SCREEN BILATERAL      2. Encounter for other screening for malignant neoplasm of breast  JACINTA SAKINA DIGITAL SCREEN BILATERAL           The patient had her preventative health maintenance recommendations and follow-up reviewed with her at the completion of her visit.

## 2023-03-08 ENCOUNTER — HOSPITAL ENCOUNTER (OUTPATIENT)
Dept: WOMENS IMAGING | Age: 58
Discharge: HOME | End: 2023-03-10
Payer: COMMERCIAL

## 2023-03-08 DIAGNOSIS — Z12.39 ENCOUNTER FOR OTHER SCREENING FOR MALIGNANT NEOPLASM OF BREAST: ICD-10-CM

## 2023-03-08 DIAGNOSIS — Z01.419 WELL FEMALE EXAM WITH ROUTINE GYNECOLOGICAL EXAM: ICD-10-CM

## 2023-03-08 PROCEDURE — 77063 BREAST TOMOSYNTHESIS BI: CPT

## 2023-10-13 ENCOUNTER — HOSPITAL ENCOUNTER (OUTPATIENT)
Age: 58
Discharge: HOME | End: 2023-10-13
Payer: COMMERCIAL

## 2023-10-13 DIAGNOSIS — E55.9 VITAMIN D DEFICIENCY: ICD-10-CM

## 2023-10-13 DIAGNOSIS — E78.00 HYPERCHOLESTEREMIA: ICD-10-CM

## 2023-10-13 DIAGNOSIS — I48.0 PAROXYSMAL A-FIB (HCC): ICD-10-CM

## 2023-10-13 LAB
25(OH)D3 SERPL-MCNC: 40.2 NG/ML (ref 30–100)
ALBUMIN SERPL-MCNC: 4.2 G/DL (ref 3.5–5.2)
ALP SERPL-CCNC: 73 U/L (ref 35–104)
ALT SERPL-CCNC: 17 U/L (ref 5–33)
ANION GAP SERPL CALCULATED.3IONS-SCNC: 8 MMOL/L (ref 9–17)
AST SERPL-CCNC: 22 U/L
BILIRUB SERPL-MCNC: 0.4 MG/DL (ref 0.3–1.2)
BUN SERPL-MCNC: 19 MG/DL (ref 6–20)
CALCIUM SERPL-MCNC: 9.3 MG/DL (ref 8.6–10.4)
CHLORIDE SERPL-SCNC: 104 MMOL/L (ref 98–107)
CHOLEST SERPL-MCNC: 146 MG/DL
CHOLESTEROL/HDL RATIO: 2.9
CO2 SERPL-SCNC: 27 MMOL/L (ref 20–31)
CREAT SERPL-MCNC: 0.6 MG/DL (ref 0.5–0.9)
ERYTHROCYTE [DISTWIDTH] IN BLOOD BY AUTOMATED COUNT: 13.1 % (ref 11.5–14.9)
GFR SERPL CREATININE-BSD FRML MDRD: >60 ML/MIN/1.73M2
GLUCOSE SERPL-MCNC: 110 MG/DL (ref 70–99)
HCT VFR BLD AUTO: 39.4 % (ref 36–46)
HDLC SERPL-MCNC: 50 MG/DL
HGB BLD-MCNC: 13 G/DL (ref 12–16)
LDLC SERPL CALC-MCNC: 85 MG/DL (ref 0–130)
MCH RBC QN AUTO: 29.8 PG (ref 26–34)
MCHC RBC AUTO-ENTMCNC: 33 G/DL (ref 31–37)
MCV RBC AUTO: 90.3 FL (ref 80–100)
PLATELET # BLD AUTO: 205 K/UL (ref 150–450)
PMV BLD AUTO: 8.7 FL (ref 6–12)
POTASSIUM SERPL-SCNC: 4.1 MMOL/L (ref 3.7–5.3)
PROT SERPL-MCNC: 7 G/DL (ref 6.4–8.3)
RBC # BLD AUTO: 4.36 M/UL (ref 4–5.2)
SODIUM SERPL-SCNC: 139 MMOL/L (ref 135–144)
TRIGL SERPL-MCNC: 56 MG/DL
TSH SERPL DL<=0.05 MIU/L-ACNC: 1.45 UIU/ML (ref 0.3–5)
WBC OTHER # BLD: 4.7 K/UL (ref 3.5–11)

## 2023-10-13 PROCEDURE — 85027 COMPLETE CBC AUTOMATED: CPT

## 2023-10-13 PROCEDURE — 82306 VITAMIN D 25 HYDROXY: CPT

## 2023-10-13 PROCEDURE — 84443 ASSAY THYROID STIM HORMONE: CPT

## 2023-10-13 PROCEDURE — 80053 COMPREHEN METABOLIC PANEL: CPT

## 2023-10-13 PROCEDURE — 36415 COLL VENOUS BLD VENIPUNCTURE: CPT

## 2023-10-13 PROCEDURE — 80061 LIPID PANEL: CPT

## 2024-01-29 ENCOUNTER — OFFICE VISIT (OUTPATIENT)
Dept: OBGYN CLINIC | Age: 59
End: 2024-01-29
Payer: COMMERCIAL

## 2024-01-29 VITALS
BODY MASS INDEX: 30.62 KG/M2 | DIASTOLIC BLOOD PRESSURE: 82 MMHG | HEIGHT: 68 IN | WEIGHT: 202 LBS | SYSTOLIC BLOOD PRESSURE: 118 MMHG

## 2024-01-29 DIAGNOSIS — Z12.31 ENCOUNTER FOR SCREENING MAMMOGRAM FOR MALIGNANT NEOPLASM OF BREAST: ICD-10-CM

## 2024-01-29 DIAGNOSIS — Z01.419 WELL FEMALE EXAM WITH ROUTINE GYNECOLOGICAL EXAM: Primary | ICD-10-CM

## 2024-01-29 DIAGNOSIS — R39.9 UTI SYMPTOMS: ICD-10-CM

## 2024-01-29 PROCEDURE — 99396 PREV VISIT EST AGE 40-64: CPT | Performed by: NURSE PRACTITIONER

## 2024-01-29 PROCEDURE — G8482 FLU IMMUNIZE ORDER/ADMIN: HCPCS | Performed by: NURSE PRACTITIONER

## 2024-01-29 NOTE — PROGRESS NOTES
motion, stability and strength were evaluated and found to be appropriate for the patients age.        ASSESSMENT:      58 y.o. Annual   Diagnosis Orders   1. Well female exam with routine gynecological exam        2. Encounter for screening mammogram for malignant neoplasm of breast  JACINTA DIGITAL SCREEN W OR WO CAD BILATERAL      3. UTI symptoms  Urinalysis with Reflex to Culture             Chief Complaint   Patient presents with    Annual Exam          Past Medical History:   Diagnosis Date    Atrial fibrillation (HCC)     Atrial flutter (HCC) 03/26/2017    CHF (congestive heart failure) (HCC)     Hx of per [facility] chart    Chronic systolic dysfunction of left ventricle 04/06/2017    Colon polyp     Hyperlipidemia     Left ventricular dysfunction 04/06/2017    Nonsustained ventricular tachycardia (HCC) 04/26/2017    BRAULIO on CPAP 06/08/2021    Pericardial effusion     Hx of per [facility] chart    Syncope     Vision abnormalities     glasses         Patient Active Problem List    Diagnosis Date Noted    BRAULIO on CPAP 06/08/2021    Apnea 07/16/2018    Presence of other cardiac implants and grafts 12/20/2017     Loop recorder present.      Abnormal vision 09/11/2017     glasses  Overview:   Overview:   glasses    Formatting of this note might be different from the original.  Overview:   glasses      Nonsustained ventricular tachycardia (HCC) 04/26/2017    Paroxysmal atrial fibrillation (HCC) 04/26/2017    Left ventricular dysfunction 04/06/2017    Chronic systolic dysfunction of left ventricle 04/06/2017    Atrial flutter (HCC) 03/26/2017    Palpitations 03/30/2016    Hypermetropia 09/20/2012    Presbyopia 09/20/2012    Regular astigmatism 09/20/2012    Tear film insufficiency 09/20/2012          Hereditary Breast, Ovarian, Colon and Uterine Cancer screening Done.          Tobacco & Secondary smoke risks reviewed; instructed on cessation and avoidance      Counseling Completed:  Preventative Health Recommendations and

## 2024-01-31 ENCOUNTER — HOSPITAL ENCOUNTER (OUTPATIENT)
Age: 59
Discharge: HOME | End: 2024-01-31
Payer: COMMERCIAL

## 2024-01-31 DIAGNOSIS — R39.9 UTI SYMPTOMS: ICD-10-CM

## 2024-01-31 LAB
AMORPH SED URNS QL MICRO: ABNORMAL
BACTERIA URNS QL MICRO: ABNORMAL
BILIRUB UR QL STRIP: NEGATIVE
CLARITY UR: CLEAR
COLOR UR: YELLOW
EPI CELLS #/AREA URNS HPF: ABNORMAL /HPF
GLUCOSE UR STRIP-MCNC: NEGATIVE MG/DL
HGB UR QL STRIP.AUTO: NEGATIVE
KETONES UR STRIP-MCNC: NEGATIVE MG/DL
LEUKOCYTE ESTERASE UR QL STRIP: ABNORMAL
NITRITE UR QL STRIP: NEGATIVE
PH UR STRIP: 5.5 [PH] (ref 5–8)
PROT UR STRIP-MCNC: NEGATIVE MG/DL
RBC #/AREA URNS HPF: ABNORMAL /HPF
SP GR UR STRIP: 1.01 (ref 1–1.03)
UROBILINOGEN UR STRIP-ACNC: NORMAL EU/DL (ref 0–1)
WBC #/AREA URNS HPF: ABNORMAL /HPF

## 2024-01-31 PROCEDURE — 81001 URINALYSIS AUTO W/SCOPE: CPT

## 2024-01-31 PROCEDURE — 87086 URINE CULTURE/COLONY COUNT: CPT

## 2024-02-01 LAB
MICROORGANISM SPEC CULT: NORMAL
SPECIMEN DESCRIPTION: NORMAL

## 2024-02-26 ENCOUNTER — HOSPITAL ENCOUNTER (OUTPATIENT)
Age: 59
Discharge: HOME | End: 2024-02-28
Payer: COMMERCIAL

## 2024-02-26 ENCOUNTER — HOSPITAL ENCOUNTER (OUTPATIENT)
Dept: GENERAL RADIOLOGY | Age: 59
Discharge: HOME | End: 2024-02-28
Payer: COMMERCIAL

## 2024-02-26 DIAGNOSIS — M54.6 PAIN IN THORACIC SPINE: ICD-10-CM

## 2024-02-26 PROCEDURE — 72100 X-RAY EXAM L-S SPINE 2/3 VWS: CPT

## 2024-02-26 PROCEDURE — 72072 X-RAY EXAM THORAC SPINE 3VWS: CPT

## 2024-03-11 ENCOUNTER — HOSPITAL ENCOUNTER (OUTPATIENT)
Dept: WOMENS IMAGING | Age: 59
Discharge: HOME | End: 2024-03-13
Payer: COMMERCIAL

## 2024-03-11 VITALS — HEIGHT: 68 IN | WEIGHT: 197 LBS | BODY MASS INDEX: 29.86 KG/M2

## 2024-03-11 DIAGNOSIS — Z12.31 ENCOUNTER FOR SCREENING MAMMOGRAM FOR MALIGNANT NEOPLASM OF BREAST: ICD-10-CM

## 2024-03-11 PROCEDURE — 77063 BREAST TOMOSYNTHESIS BI: CPT

## 2024-10-11 ENCOUNTER — HOSPITAL ENCOUNTER (OUTPATIENT)
Age: 59
Discharge: HOME | End: 2024-10-11
Payer: COMMERCIAL

## 2024-10-11 DIAGNOSIS — E78.00 HYPERCHOLESTEREMIA: ICD-10-CM

## 2024-10-11 DIAGNOSIS — E55.9 VITAMIN D DEFICIENCY: ICD-10-CM

## 2024-10-11 DIAGNOSIS — M47.815 SPONDYLOSIS OF THORACOLUMBAR REGION WITHOUT MYELOPATHY OR RADICULOPATHY: ICD-10-CM

## 2024-10-11 DIAGNOSIS — I48.0 PAROXYSMAL A-FIB (HCC): ICD-10-CM

## 2024-10-11 LAB
25(OH)D3 SERPL-MCNC: 45.4 NG/ML (ref 30–100)
ALBUMIN SERPL-MCNC: 4.5 G/DL (ref 3.5–5.2)
ALP SERPL-CCNC: 72 U/L (ref 35–104)
ALT SERPL-CCNC: 15 U/L (ref 10–35)
ANION GAP SERPL CALCULATED.3IONS-SCNC: 10 MMOL/L (ref 9–16)
AST SERPL-CCNC: 25 U/L (ref 10–35)
BILIRUB SERPL-MCNC: 0.5 MG/DL (ref 0–1.2)
BUN SERPL-MCNC: 17 MG/DL (ref 8–23)
CALCIUM SERPL-MCNC: 9.6 MG/DL (ref 8.6–10.4)
CHLORIDE SERPL-SCNC: 105 MMOL/L (ref 98–107)
CHOLEST SERPL-MCNC: 150 MG/DL (ref 0–199)
CO2 SERPL-SCNC: 26 MMOL/L (ref 20–31)
CREAT SERPL-MCNC: 0.8 MG/DL (ref 0.7–1.2)
ERYTHROCYTE [DISTWIDTH] IN BLOOD BY AUTOMATED COUNT: 13 % (ref 11.5–14.9)
GFR, ESTIMATED: 84 ML/MIN/1.73M2
GLUCOSE SERPL-MCNC: 105 MG/DL (ref 74–99)
HCT VFR BLD AUTO: 43.5 % (ref 36–46)
HDLC SERPL-MCNC: 60 MG/DL
HGB BLD-MCNC: 14.5 G/DL (ref 12–16)
LDLC SERPL CALC-MCNC: 78 MG/DL (ref 0–100)
MCH RBC QN AUTO: 30.3 PG (ref 26–34)
MCHC RBC AUTO-ENTMCNC: 33.3 G/DL (ref 31–37)
MCV RBC AUTO: 91 FL (ref 80–100)
PLATELET # BLD AUTO: 209 K/UL (ref 150–450)
PMV BLD AUTO: 8.9 FL (ref 6–12)
POTASSIUM SERPL-SCNC: 4.2 MMOL/L (ref 3.7–5.3)
PROT SERPL-MCNC: 7.4 G/DL (ref 6.6–8.7)
RBC # BLD AUTO: 4.78 M/UL (ref 4–5.2)
SODIUM SERPL-SCNC: 141 MMOL/L (ref 136–145)
TRIGL SERPL-MCNC: 62 MG/DL (ref 0–149)
TSH SERPL DL<=0.05 MIU/L-ACNC: 1.34 UIU/ML (ref 0.27–4.2)
WBC OTHER # BLD: 5.8 K/UL (ref 3.5–11)

## 2024-10-11 PROCEDURE — 36415 COLL VENOUS BLD VENIPUNCTURE: CPT

## 2024-10-11 PROCEDURE — 85027 COMPLETE CBC AUTOMATED: CPT

## 2024-10-11 PROCEDURE — 84443 ASSAY THYROID STIM HORMONE: CPT

## 2024-10-11 PROCEDURE — 80061 LIPID PANEL: CPT

## 2024-10-11 PROCEDURE — 82306 VITAMIN D 25 HYDROXY: CPT

## 2024-10-11 PROCEDURE — 80053 COMPREHEN METABOLIC PANEL: CPT

## 2025-01-28 PROBLEM — G47.33 OBSTRUCTIVE SLEEP APNEA (ADULT) (PEDIATRIC): Status: ACTIVE | Noted: 2021-06-08

## 2025-01-28 PROBLEM — Z95.818 PRESENCE OF OTHER CARDIAC IMPLANTS AND GRAFTS: Status: RESOLVED | Noted: 2017-12-20 | Resolved: 2025-01-28

## 2025-01-29 ENCOUNTER — OFFICE VISIT (OUTPATIENT)
Dept: OBGYN CLINIC | Age: 60
End: 2025-01-29
Payer: COMMERCIAL

## 2025-01-29 VITALS — SYSTOLIC BLOOD PRESSURE: 116 MMHG | DIASTOLIC BLOOD PRESSURE: 74 MMHG

## 2025-01-29 DIAGNOSIS — Z01.419 WELL FEMALE EXAM WITH ROUTINE GYNECOLOGICAL EXAM: Primary | ICD-10-CM

## 2025-01-29 DIAGNOSIS — Z12.31 ENCOUNTER FOR SCREENING MAMMOGRAM FOR MALIGNANT NEOPLASM OF BREAST: ICD-10-CM

## 2025-01-29 PROCEDURE — 99396 PREV VISIT EST AGE 40-64: CPT | Performed by: NURSE PRACTITIONER

## 2025-01-29 NOTE — PROGRESS NOTES
History and Physical  [facility]  [facility] [place] [address]  [place], [place]  [address] [phone]   Fax [phone]  Rayne Hull  2025              59 y.o.  Chief Complaint   Patient presents with    Annual Exam       No LMP recorded. Patient has had a hysterectomy.             Primary Care Physician: Mahendra Decker MD    The patient was seen and examined. She has no chief complaint today and is here for her annual exam.  Her bowels are regular. There are no voiding complaints. She denies any bloating.  She denies vaginal discharge and was counseled on STD's and the need for barrier contraception.     HPI : Rayne Hull is a 59 y.o. female     Annual exam  Hx of hysterectomy with ovarian preservation in  for heavy menses. Denies hx of cervical dysplasia.   Denies PMB.  ________________________________________________________________________  OB History    Para Term  AB Living   3 1 1 0 2 1   SAB IAB Ectopic Molar Multiple Live Births   2 0 0 0 0 1      # Outcome Date GA Lbr Acosta/2nd Weight Sex Type Anes PTL Lv   3 SAB            2 SAB            1 Term    3.827 kg (8 lb 7 oz) F Vag-Spont   ZOEY     Past Medical History:   Diagnosis Date    Atrial fibrillation (HCC)     Atrial flutter (HCC) 2017    CHF (congestive heart failure) (HCC)     Hx of per [facility] chart    Chronic systolic dysfunction of left ventricle 2017    Colon polyp     Hyperlipidemia     Left ventricular dysfunction 2017    Nonsustained ventricular tachycardia (HCC) 2017    BRAULIO on CPAP 2021    Pericardial effusion     Hx of per [facility] chart    Presence of other cardiac implants and grafts 2017    Syncope     Vision abnormalities     glasses                                                                   Past Surgical History:   Procedure Laterality Date    ABLATION OF DYSRHYTHMIC FOCUS Bilateral 2017    Dr. Reyes    APPENDECTOMY  1974

## 2025-04-22 ENCOUNTER — HOSPITAL ENCOUNTER (OUTPATIENT)
Dept: WOMENS IMAGING | Age: 60
Discharge: HOME | End: 2025-04-24
Payer: COMMERCIAL

## 2025-04-22 VITALS — BODY MASS INDEX: 30.01 KG/M2 | HEIGHT: 68 IN | WEIGHT: 198 LBS

## 2025-04-22 DIAGNOSIS — Z12.31 ENCOUNTER FOR SCREENING MAMMOGRAM FOR MALIGNANT NEOPLASM OF BREAST: ICD-10-CM

## 2025-04-22 PROCEDURE — 77063 BREAST TOMOSYNTHESIS BI: CPT

## 2025-04-23 ENCOUNTER — RESULTS FOLLOW-UP (OUTPATIENT)
Dept: OBGYN CLINIC | Age: 60
End: 2025-04-23

## (undated) DEVICE — AIRLIFE™ NASAL OXYGEN CANNULA CURVED, FLARED TIP, WITH 7 FEET (2.1 M) CRUSH RESISTANT TUBING, OVER-THE-EAR STYLE: Brand: AIRLIFE™

## (undated) DEVICE — FORCEPS BX L240CM WRK CHN 2.8MM STD CAP W/ NDL MIC MESH

## (undated) DEVICE — ENDO KIT W/SYRINGE: Brand: MEDLINE INDUSTRIES, INC.

## (undated) DEVICE — DEFENDO AIR WATER SUCTION AND BIOPSY VALVE KIT FOR  OLYMPUS: Brand: DEFENDO AIR/WATER/SUCTION AND BIOPSY VALVE

## (undated) DEVICE — GLOVE ORANGE PI 7 1/2   MSG9075